# Patient Record
Sex: MALE | Race: WHITE | Employment: OTHER | URBAN - METROPOLITAN AREA
[De-identification: names, ages, dates, MRNs, and addresses within clinical notes are randomized per-mention and may not be internally consistent; named-entity substitution may affect disease eponyms.]

---

## 2017-04-20 PROBLEM — Z87.442 HISTORY OF NEPHROLITHIASIS: Status: ACTIVE | Noted: 2017-04-20

## 2017-04-20 PROBLEM — K57.30 DIVERTICULOSIS OF LARGE INTESTINE WITHOUT HEMORRHAGE: Status: ACTIVE | Noted: 2017-04-20

## 2017-05-17 ENCOUNTER — HOSPITAL ENCOUNTER (OUTPATIENT)
Dept: ULTRASOUND IMAGING | Age: 67
Discharge: HOME | End: 2017-05-17
Attending: INTERNAL MEDICINE
Payer: MEDICARE

## 2017-05-17 DIAGNOSIS — R31.9 HEMATURIA: ICD-10-CM

## 2017-05-17 DIAGNOSIS — R10.9 RIGHT FLANK PAIN: ICD-10-CM

## 2017-05-17 DIAGNOSIS — Z87.442 HISTORY OF NEPHROLITHIASIS: ICD-10-CM

## 2017-05-17 DIAGNOSIS — R80.9 PROTEINURIA, UNSPECIFIED TYPE: ICD-10-CM

## 2017-05-17 PROCEDURE — 76770 US EXAM ABDO BACK WALL COMP: CPT

## 2017-05-17 NOTE — PROGRESS NOTES
Ultrasound suggests kidney stone on right but not obstructing and no hydronephrosis  Next step would be Urology eval

## 2017-05-26 PROBLEM — R73.9 ELEVATED BLOOD SUGAR: Status: ACTIVE | Noted: 2017-05-26

## 2017-05-26 PROBLEM — N40.1 BENIGN PROSTATIC HYPERPLASIA WITH LOWER URINARY TRACT SYMPTOMS: Status: ACTIVE | Noted: 2017-05-26

## 2017-05-31 PROBLEM — I25.83 CORONARY ARTERY DISEASE DUE TO LIPID RICH PLAQUE: Status: ACTIVE | Noted: 2017-05-31

## 2017-05-31 PROBLEM — I25.10 CORONARY ARTERY DISEASE DUE TO LIPID RICH PLAQUE: Status: ACTIVE | Noted: 2017-05-31

## 2019-07-18 PROBLEM — D53.9 MACROCYTIC ANEMIA: Status: ACTIVE | Noted: 2019-07-18

## 2019-07-18 PROBLEM — F10.10 ALCOHOL ABUSE: Status: ACTIVE | Noted: 2019-07-18

## 2019-07-18 PROBLEM — R97.20 ELEVATED PSA: Status: ACTIVE | Noted: 2019-07-18

## 2019-07-18 PROBLEM — R74.8 ELEVATED LIVER ENZYMES: Status: ACTIVE | Noted: 2019-07-18

## 2019-08-16 PROBLEM — E66.01 SEVERE OBESITY (HCC): Status: ACTIVE | Noted: 2019-08-16

## 2019-08-27 NOTE — H&P (VIEW-ONLY)
Yasmin Mosquera MD, [facility], [address] [place] [address]  Juan [facility]  Phone [phone]   Fax [phone]      Date of visit: 8/27/2019     Primary/Requesting provider: Maneul Downs MD    Chief Complaint   Patient presents with   [address] [place] [address] New Patient     skin cancer lesion-mid chest          HISTORY OF PRESENT ILLNESS  Marianela Concepcion is a 71 y.o. male. New Patient       Marianela Concepcion is an 71 y.o. male who presents for evaluation of a  skin cancer, suspicoius/worrisome lesion located on the central anterior chest.   Onset of symptoms was 4 months ago, with rapidly worsening course since that time. Symptoms include erythema and drainage. Patient denies  fever and chills. There is not a history of trauma to the area, although patient has tried to manually express the lesion believing it was a cyst or skin infection with enlargement each time. Treatment to date has included none     Medications:   Current Outpatient Medications   Medication Sig    levothyroxine (SYNTHROID) 200 mcg tablet Take 1 Tab by mouth Daily (before breakfast).  omeprazole (PRILOSEC) 20 mg capsule Take 1 Cap by mouth daily.  losartan-hydroCHLOROthiazide (HYZAAR) 100-25 mg per tablet Take 1 Tab by mouth daily.  spironolactone (ALDACTONE) 50 mg tablet Take 1 Tab by mouth daily.  tamsulosin (FLOMAX) 0.4 mg capsule Take 1 Cap by mouth daily. Indications: enlarged prostate with urination problem, stones in the urinary tract    traMADol (ULTRAM) 50 mg tablet Take 1 Tab by mouth every eight (8) hours as needed for Pain for up to 30 days. Max Daily Amount: 150 mg.    tadalafil (CIALIS) 20 mg tablet Take 1 Tab by mouth as needed.  clotrimazole-betamethasone (LOTRISONE) topical cream Apply  to affected area two (2) times a day. (Patient taking differently: Apply  to affected area two (2) times daily as needed.)     No current facility-administered medications for this visit.          Allergies: No Known Allergies     Past History:  Past Medical History:   Diagnosis Date    Aortic heart murmur 2019    Per pt  told by Dr. Graig Mohs ASCVD (arteriosclerotic cardiovascular disease)     calcification on CT scan    BCC (basal cell carcinoma of skin)     Diverticulosis 04/17/2017    CT scan- fatty liver, diverticulosis, umbilical hernia, prostate enlargement, normal appendix    Elevated liver enzymes     history of hepatic steatosis    Elevated PSA, less than 10 ng/ml 2019    GERD (gastroesophageal reflux disease) 06/26/2014    Managed with meds     Hypertension 06/26/2014    Managed with meds     Impotence of organic origin     Liver disease     ascites, per PCP office note dated 7/18/19- Fluid retention is impressive.  Macrocytic anemia     Mixed hyperlipidemia 6/26/2014    Nephrolithiasis     Obesity 5/8/2015    Osteoarthrosis 6/27/2014    Unspecified hypothyroidism 6/26/2014      History reviewed. No pertinent surgical history. Family and Social History:  Family History   Problem Relation Age of Onset    Cancer Father         pancreatic     Social History     Socioeconomic History    Marital status:      Spouse name: Not on file    Number of children: Not on file    Years of education: Not on file    Highest education level: Not on file   Occupational History    Not on file   Social Needs    Financial resource strain: Not on file    Food insecurity:     Worry: Not on file     Inability: Not on file    Transportation needs:     Medical: Not on file     Non-medical: Not on file   Tobacco Use    Smoking status: Never Smoker    Smokeless tobacco: Never Used   Substance and Sexual Activity    Alcohol use:  Yes     Alcohol/week: 14.0 standard drinks     Types: 14 Glasses of wine per week    Drug use: No    Sexual activity: Not on file   Lifestyle    Physical activity:     Days per week: Not on file     Minutes per session: Not on file    Stress: Not on file   Relationships  Social connections:     Talks on phone: Not on file     Gets together: Not on file     Attends Rastafarian service: Not on file     Active member of club or organization: Not on file     Attends meetings of clubs or organizations: Not on file     Relationship status: Not on file    Intimate partner violence:     Fear of current or ex partner: Not on file     Emotionally abused: Not on file     Physically abused: Not on file     Forced sexual activity: Not on file   Other Topics Concern    Not on file   Social History Narrative    Not on file         Review of Systems   Constitutional: Negative. HENT: Negative. Eyes: Negative. Respiratory: Negative. Cardiovascular: Negative. Gastrointestinal:        Ascites,   Genitourinary: Negative. Musculoskeletal: Negative. Skin:        Skin lesion on chest present x 4 months   Neurological: Negative. Endo/Heme/Allergies: Negative. Psychiatric/Behavioral: Negative. Physical Exam   Constitutional: He is oriented to person, place, and time. He appears well-developed. Eyes: No scleral icterus. Cardiovascular: Normal rate. Murmur heard. Pulmonary/Chest: Effort normal. He has no wheezes. Neurological: He is alert and oriented to person, place, and time. No cranial nerve deficit. Skin: Skin is warm and dry. approx 2cm malignancy of central sternum, with central ulcer and eschar and heaped, pearly peripheral margins. Surrounding 2cm zone of mild hyperemia without induration   Psychiatric: He has a normal mood and affect. His behavior is normal. Thought content normal.   Vitals reviewed. ASSESSMENT and PLAN  Encounter Diagnoses   Name Primary?  Basal cell carcinoma (BCC) of chest Yes     Will arrange excision of the lesion(s), in the OR due to probable need for significant skin mobilization to allow closure. Procedure reviewed.   Risks reviewed to include bleeding, infection, scarring, recurrence, and need for re-excision if procedure done for malignant process.

## 2019-08-28 ENCOUNTER — HOSPITAL ENCOUNTER (OUTPATIENT)
Dept: SURGERY | Age: 69
Discharge: HOME | End: 2019-08-28

## 2019-08-28 VITALS — HEIGHT: 71 IN | BODY MASS INDEX: 31.92 KG/M2 | WEIGHT: 228 LBS

## 2019-08-28 NOTE — PERIOP NOTES
Anesthesia () reviewed chart. Agreeable for pt to proceed to surgery if receiving monitored anesthesia w/local for procedure. Per , would like for pt to have an echo prior to general anesthesia due to extension medical history. Mikala Espana

## 2019-08-28 NOTE — PERIOP NOTES
Patient verified name and . Order for consent not found in EHR. Type 1B surgery, PAT phone assessment complete. Orders not received. Labs per surgeon: no orders received. Labs per anesthesia protocol: K+ and HGB DOS- pt states he will have lab work drawn on the [employer]. Pt informed if K+ or Hgb is abnormal that surgery could be cancelled. Aortic murmur documented in chart. Pt states he was told by Dr. Aida Garcia that he had a heart murmur. Pt denies ever having an echo. Per PCP office note dated 19- Cardiovascular: Normal rate and regular rhythm. Harsh holosystolic m. Will have anesthesia review PCP office note and chart. Patient answered medical/surgical history questions at their best of ability. All prior to admission medications documented in [facility]. Patient instructed to take the following medications the day of surgery according to anesthesia guidelines with a small sip of water: levothyroxine, prilosec and flomax. Hold all vitamins 7 days prior to surgery and NSAIDS 5 days prior to surgery. Patient instructed on the following:  Arrive at A Entrance, time of arrival to be called the day before by 1700  NPO after midnight including gum, mints, and ice chips  Responsible adult must drive patient to the hospital, stay during surgery, and patient will need supervision 24 hours after anesthesia  Use antibacterial soap in shower the night before surgery and on the morning of surgery  All piercings must be removed prior to arrival.    Leave all valuables (money and jewelry) at home but bring insurance card and ID on   DOS. Do not wear make-up, nail polish, lotions, cologne, perfumes, powders, or oil on skin. Patient teach back successful and patient demonstrates knowledge of instruction.

## 2019-09-03 ENCOUNTER — ANESTHESIA (OUTPATIENT)
Dept: SURGERY | Age: 69
End: 2019-09-03
Payer: MEDICARE

## 2019-09-03 ENCOUNTER — HOSPITAL ENCOUNTER (OUTPATIENT)
Age: 69
Setting detail: OUTPATIENT SURGERY
Discharge: HOME | End: 2019-09-03
Attending: SURGERY | Admitting: SURGERY
Payer: MEDICARE

## 2019-09-03 ENCOUNTER — ANESTHESIA EVENT (OUTPATIENT)
Dept: SURGERY | Age: 69
End: 2019-09-03
Payer: MEDICARE

## 2019-09-03 VITALS
OXYGEN SATURATION: 96 % | SYSTOLIC BLOOD PRESSURE: 102 MMHG | HEIGHT: 71 IN | WEIGHT: 226.6 LBS | HEART RATE: 95 BPM | RESPIRATION RATE: 18 BRPM | TEMPERATURE: 98.1 F | DIASTOLIC BLOOD PRESSURE: 61 MMHG | BODY MASS INDEX: 31.72 KG/M2

## 2019-09-03 DIAGNOSIS — C44.90 SKIN CANCER: Primary | ICD-10-CM

## 2019-09-03 LAB
HGB BLD-MCNC: 9.8 G/DL (ref 13.6–17.2)
POTASSIUM BLD-SCNC: 4.9 MMOL/L (ref 3.5–5.1)

## 2019-09-03 PROCEDURE — 77030031139 HC SUT VCRL2 J&J -A: Performed by: SURGERY

## 2019-09-03 PROCEDURE — 76060000032 HC ANESTHESIA 0.5 TO 1 HR: Performed by: SURGERY

## 2019-09-03 PROCEDURE — 84132 ASSAY OF SERUM POTASSIUM: CPT

## 2019-09-03 PROCEDURE — 76010000138 HC OR TIME 0.5 TO 1 HR: Performed by: SURGERY

## 2019-09-03 PROCEDURE — 88305 TISSUE EXAM BY PATHOLOGIST: CPT

## 2019-09-03 PROCEDURE — 76210000020 HC REC RM PH II FIRST 0.5 HR: Performed by: SURGERY

## 2019-09-03 PROCEDURE — 74011250636 HC RX REV CODE- 250/636

## 2019-09-03 PROCEDURE — 77030010509 HC AIRWY LMA MSK TELE -A: Performed by: ANESTHESIOLOGY

## 2019-09-03 PROCEDURE — 74011250636 HC RX REV CODE- 250/636: Performed by: ANESTHESIOLOGY

## 2019-09-03 PROCEDURE — 77030002916 HC SUT ETHLN J&J -A: Performed by: SURGERY

## 2019-09-03 PROCEDURE — 85018 HEMOGLOBIN: CPT

## 2019-09-03 PROCEDURE — 77030018836 HC SOL IRR NACL ICUM -A: Performed by: SURGERY

## 2019-09-03 PROCEDURE — 74011000250 HC RX REV CODE- 250: Performed by: SURGERY

## 2019-09-03 PROCEDURE — 76210000063 HC OR PH I REC FIRST 0.5 HR: Performed by: SURGERY

## 2019-09-03 PROCEDURE — 74011000250 HC RX REV CODE- 250

## 2019-09-03 RX ORDER — DEXAMETHASONE SODIUM PHOSPHATE 4 MG/ML
INJECTION, SOLUTION INTRA-ARTICULAR; INTRALESIONAL; INTRAMUSCULAR; INTRAVENOUS; SOFT TISSUE AS NEEDED
Status: DISCONTINUED | OUTPATIENT
Start: 2019-09-03 | End: 2019-09-03 | Stop reason: HOSPADM

## 2019-09-03 RX ORDER — MIDAZOLAM HYDROCHLORIDE 1 MG/ML
5 INJECTION, SOLUTION INTRAMUSCULAR; INTRAVENOUS ONCE
Status: DISCONTINUED | OUTPATIENT
Start: 2019-09-03 | End: 2019-09-03 | Stop reason: HOSPADM

## 2019-09-03 RX ORDER — SODIUM CHLORIDE, SODIUM LACTATE, POTASSIUM CHLORIDE, CALCIUM CHLORIDE 600; 310; 30; 20 MG/100ML; MG/100ML; MG/100ML; MG/100ML
75 INJECTION, SOLUTION INTRAVENOUS CONTINUOUS
Status: DISCONTINUED | OUTPATIENT
Start: 2019-09-03 | End: 2019-09-03 | Stop reason: HOSPADM

## 2019-09-03 RX ORDER — ONDANSETRON 2 MG/ML
INJECTION INTRAMUSCULAR; INTRAVENOUS AS NEEDED
Status: DISCONTINUED | OUTPATIENT
Start: 2019-09-03 | End: 2019-09-03 | Stop reason: HOSPADM

## 2019-09-03 RX ORDER — PROPOFOL 10 MG/ML
INJECTION, EMULSION INTRAVENOUS AS NEEDED
Status: DISCONTINUED | OUTPATIENT
Start: 2019-09-03 | End: 2019-09-03 | Stop reason: HOSPADM

## 2019-09-03 RX ORDER — OXYCODONE HYDROCHLORIDE 5 MG/1
5 TABLET ORAL
Status: DISCONTINUED | OUTPATIENT
Start: 2019-09-03 | End: 2019-09-03 | Stop reason: HOSPADM

## 2019-09-03 RX ORDER — EPHEDRINE SULFATE 50 MG/ML
INJECTION, SOLUTION INTRAVENOUS AS NEEDED
Status: DISCONTINUED | OUTPATIENT
Start: 2019-09-03 | End: 2019-09-03 | Stop reason: HOSPADM

## 2019-09-03 RX ORDER — HYDROMORPHONE HYDROCHLORIDE 2 MG/ML
0.5 INJECTION, SOLUTION INTRAMUSCULAR; INTRAVENOUS; SUBCUTANEOUS
Status: DISCONTINUED | OUTPATIENT
Start: 2019-09-03 | End: 2019-09-03 | Stop reason: HOSPADM

## 2019-09-03 RX ORDER — FENTANYL CITRATE 50 UG/ML
INJECTION, SOLUTION INTRAMUSCULAR; INTRAVENOUS AS NEEDED
Status: DISCONTINUED | OUTPATIENT
Start: 2019-09-03 | End: 2019-09-03 | Stop reason: HOSPADM

## 2019-09-03 RX ORDER — FENTANYL CITRATE 50 UG/ML
100 INJECTION, SOLUTION INTRAMUSCULAR; INTRAVENOUS ONCE
Status: DISCONTINUED | OUTPATIENT
Start: 2019-09-03 | End: 2019-09-03 | Stop reason: HOSPADM

## 2019-09-03 RX ORDER — LIDOCAINE HYDROCHLORIDE 10 MG/ML
0.1 INJECTION INFILTRATION; PERINEURAL AS NEEDED
Status: DISCONTINUED | OUTPATIENT
Start: 2019-09-03 | End: 2019-09-03 | Stop reason: HOSPADM

## 2019-09-03 RX ORDER — HYDROCODONE BITARTRATE AND ACETAMINOPHEN 5; 325 MG/1; MG/1
2 TABLET ORAL AS NEEDED
Status: DISCONTINUED | OUTPATIENT
Start: 2019-09-03 | End: 2019-09-03 | Stop reason: HOSPADM

## 2019-09-03 RX ORDER — LIDOCAINE HYDROCHLORIDE 20 MG/ML
INJECTION, SOLUTION EPIDURAL; INFILTRATION; INTRACAUDAL; PERINEURAL AS NEEDED
Status: DISCONTINUED | OUTPATIENT
Start: 2019-09-03 | End: 2019-09-03 | Stop reason: HOSPADM

## 2019-09-03 RX ORDER — MIDAZOLAM HYDROCHLORIDE 1 MG/ML
2 INJECTION, SOLUTION INTRAMUSCULAR; INTRAVENOUS
Status: COMPLETED | OUTPATIENT
Start: 2019-09-03 | End: 2019-09-03

## 2019-09-03 RX ORDER — HYDROCODONE BITARTRATE AND ACETAMINOPHEN 5; 325 MG/1; MG/1
TABLET ORAL
Qty: 20 TAB | Refills: 0 | Status: SHIPPED | OUTPATIENT
Start: 2019-09-03 | End: 2019-09-10 | Stop reason: ALTCHOICE

## 2019-09-03 RX ORDER — BUPIVACAINE HYDROCHLORIDE AND EPINEPHRINE 2.5; 5 MG/ML; UG/ML
INJECTION, SOLUTION EPIDURAL; INFILTRATION; INTRACAUDAL; PERINEURAL AS NEEDED
Status: DISCONTINUED | OUTPATIENT
Start: 2019-09-03 | End: 2019-09-03 | Stop reason: HOSPADM

## 2019-09-03 RX ADMIN — DEXAMETHASONE SODIUM PHOSPHATE 10 MG: 4 INJECTION, SOLUTION INTRA-ARTICULAR; INTRALESIONAL; INTRAMUSCULAR; INTRAVENOUS; SOFT TISSUE at 14:02

## 2019-09-03 RX ADMIN — ONDANSETRON 4 MG: 2 INJECTION INTRAMUSCULAR; INTRAVENOUS at 14:02

## 2019-09-03 RX ADMIN — PROPOFOL 200 MG: 10 INJECTION, EMULSION INTRAVENOUS at 13:53

## 2019-09-03 RX ADMIN — EPHEDRINE SULFATE 15 MG: 50 INJECTION, SOLUTION INTRAVENOUS at 14:09

## 2019-09-03 RX ADMIN — FENTANYL CITRATE 25 MCG: 50 INJECTION, SOLUTION INTRAMUSCULAR; INTRAVENOUS at 14:12

## 2019-09-03 RX ADMIN — FENTANYL CITRATE 25 MCG: 50 INJECTION, SOLUTION INTRAMUSCULAR; INTRAVENOUS at 14:04

## 2019-09-03 RX ADMIN — SODIUM CHLORIDE, SODIUM LACTATE, POTASSIUM CHLORIDE, AND CALCIUM CHLORIDE: 600; 310; 30; 20 INJECTION, SOLUTION INTRAVENOUS at 14:21

## 2019-09-03 RX ADMIN — HYDROMORPHONE HYDROCHLORIDE 0.5 MG: 2 INJECTION INTRAMUSCULAR; INTRAVENOUS; SUBCUTANEOUS at 14:58

## 2019-09-03 RX ADMIN — EPHEDRINE SULFATE 15 MG: 50 INJECTION, SOLUTION INTRAVENOUS at 14:15

## 2019-09-03 RX ADMIN — MIDAZOLAM 2 MG: 1 INJECTION INTRAMUSCULAR; INTRAVENOUS at 13:43

## 2019-09-03 RX ADMIN — LIDOCAINE HYDROCHLORIDE 100 MG: 20 INJECTION, SOLUTION EPIDURAL; INFILTRATION; INTRACAUDAL; PERINEURAL at 13:53

## 2019-09-03 RX ADMIN — FENTANYL CITRATE 50 MCG: 50 INJECTION, SOLUTION INTRAMUSCULAR; INTRAVENOUS at 13:53

## 2019-09-03 RX ADMIN — EPHEDRINE SULFATE 15 MG: 50 INJECTION, SOLUTION INTRAVENOUS at 14:00

## 2019-09-03 RX ADMIN — HYDROMORPHONE HYDROCHLORIDE 0.5 MG: 2 INJECTION INTRAMUSCULAR; INTRAVENOUS; SUBCUTANEOUS at 14:52

## 2019-09-03 RX ADMIN — SODIUM CHLORIDE, SODIUM LACTATE, POTASSIUM CHLORIDE, AND CALCIUM CHLORIDE 75 ML/HR: 600; 310; 30; 20 INJECTION, SOLUTION INTRAVENOUS at 12:43

## 2019-09-03 NOTE — ANESTHESIA PREPROCEDURE EVALUATION
Relevant Problems   No relevant active problems       Anesthetic History   No history of anesthetic complications            Review of Systems / Medical History  Patient summary reviewed and pertinent labs reviewed    Pulmonary  Within defined limits                 Neuro/Psych   Within defined limits           Cardiovascular    Hypertension: well controlled              Exercise tolerance: >4 METS     GI/Hepatic/Renal           Liver disease (Elevated LFTs)    Comments: Alcohol abuse Endo/Other      Hypothyroidism: well controlled  Anemia     Other Findings            Physical Exam    Airway  Mallampati: II  TM Distance: 4 - 6 cm  Neck ROM: normal range of motion   Mouth opening: Normal     Cardiovascular  Regular rate and rhythm,  S1 and S2 normal,  no murmur, click, rub, or gallop             Dental         Pulmonary  Breath sounds clear to auscultation               Abdominal         Other Findings            Anesthetic Plan    ASA: 3  Anesthesia type: general          Induction: Intravenous  Anesthetic plan and risks discussed with: Patient and Spouse

## 2019-09-03 NOTE — DISCHARGE INSTRUCTIONS
Patient Education        Skin Lesion Removal: What to Expect at Home  Your Recovery  After your procedure, you should not have much pain. But some soreness, swelling, or bruising is normal. Your doctor may recommend over-the-counter medicines to help with any discomfort. Most people can return to their normal routine the same day of their procedure. How quickly your wound heals depends on the size of your wound and the type of procedure you had. Most wounds take 1 to 3 weeks to heal. If you had laser surgery, your skin may change color and then slowly return to its normal color. You may need only a bandage, or you may need stitches. If you had stitches, your doctor will probably remove them 5 to 14 days later. If you have the type of stitches that dissolve, they do not have to be removed. They will disappear on their own. This care sheet gives you a general idea about how long it will take for you to recover. But each person recovers at a different pace. Follow the steps below to get better as quickly as possible. How can you care for yourself at home? Activity    · For the first few days, try not to bump or knock your wound.     · Depending on where your wound is, you may need to avoid strenuous exercise for 2 weeks after the procedure or until your doctor says it is okay.     · If you have had a lesion removed from your face, do not use makeup near your wound until you have your stitches taken out.     · Ask your doctor when it is okay to shower, bathe, or swim. Medicines    · Your doctor will tell you if and when you can restart your medicines. He or she will also give you instructions about taking any new medicines.     · If you take blood thinners, such as warfarin (Coumadin), clopidogrel (Plavix), or aspirin, be sure to talk to your doctor. He or she will tell you if and when to start taking those medicines again.  Make sure that you understand exactly what your doctor wants you to do.     · Be safe with medicines. Take pain medicines exactly as directed. ? If the doctor gave you a prescription medicine for pain, take it as prescribed. ? If you are not taking a prescription pain medicine, ask your doctor if you can take an over-the-counter medicine.    Wound care    · If your doctor told you how to care for your incision, follow your doctor's instructions. If you did not get instructions, follow this general advice:  ? Keep the wound bandaged and dry for the first day. ? After the first 24 to 48 hours, wash around the wound with clean water 2 times a day. Don't use hydrogen peroxide or alcohol, which can slow healing. ? You may cover the wound with a thin layer of petroleum jelly, such as Vaseline, and a nonstick bandage. ? Apply more petroleum jelly and replace the bandage as needed.     · If you have stitches, you may get other instructions.     · If a scab forms, do not pull it off. Let it fall off on its own. Wounds heal faster if no scab forms. Washing the area every day and using petroleum jelly will help prevent a scab from forming.     · If the wound bleeds, put direct pressure on it with a clean cloth until the bleeding stops.     · If you had a growth \"frozen\" off, you may get a blister. Do not break it. Let it dry up on its own. It is common for the blister to fill with blood. You do not need to do anything about this, but if it becomes too painful, call your doctor.     · Avoid the sun until your stitches are removed. Follow-up care is a key part of your treatment and safety. Be sure to make and go to all appointments, and call your doctor if you are having problems. It's also a good idea to know your test results and keep a list of the medicines you take. When should you call for help? Call 911 anytime you think you may need emergency care.  For example, call if:    · You passed out (lost consciousness).     · You have severe trouble breathing.     · You have sudden chest pain and shortness of breath, or you cough up blood.    Call your doctor now or seek immediate medical care if:    · You have symptoms of a blood clot in your leg (called a deep vein thrombosis), such as:  ? Pain in the calf, back of the knee, thigh, or groin. ? Redness and swelling in your leg or groin.     · You have signs of infection, such as:  ? Increased pain, swelling, warmth, or redness. ? Red streaks leading from the wound. ? Pus draining from the wound. ? A fever.     · You have pain that does not get better after you take pain medicine.     · You have loose stitches.    Watch closely for changes in your health, and be sure to contact your doctor if you have any problems. Where can you learn more? Go to http://katheryn-jose antonio.info/. Enter Q228 in the search box to learn more about \"Skin Lesion Removal: What to Expect at Home. \"  Current as of: April 1, 2019  Content Version: 12.1  © 8326-1725 [employer]. Care instructions adapted under license by [employer] (which disclaims liability or warranty for this information). If you have questions about a medical condition or this instruction, always ask your healthcare professional. [place] [address] any warranty or liability for your use of this information.

## 2019-09-03 NOTE — INTERVAL H&P NOTE
H&P Update:  Heriberto Reynolds. Ressie Dancer was seen and examined. History and physical has been reviewed. The patient has been examined.  There have been no significant clinical changes since the completion of the originally dated History and Physical.

## 2019-09-03 NOTE — ANESTHESIA POSTPROCEDURE EVALUATION
Procedure(s):  BASAL CELL MASS EXCISION OF CHEST.    general    Anesthesia Post Evaluation      Multimodal analgesia: multimodal analgesia used between 6 hours prior to anesthesia start to PACU discharge  Patient location during evaluation: bedside  Patient participation: complete - patient participated  Level of consciousness: awake and alert  Pain score: 3  Pain management: adequate  Airway patency: patent  Anesthetic complications: no  Cardiovascular status: acceptable and hemodynamically stable  Respiratory status: acceptable  Hydration status: acceptable  Post anesthesia nausea and vomiting:  none      Vitals Value Taken Time   /61 9/3/2019  2:28 PM   Temp 36.7 °C (98.1 °F) 9/3/2019  2:28 PM   Pulse 96 9/3/2019  2:28 PM   Resp 12 9/3/2019  2:28 PM   SpO2 93 % 9/3/2019  2:28 PM

## 2019-09-03 NOTE — OP NOTES
Excision of Mass Operative Report      Date of Procedure: 9/3/2019    Preoperative Diagnosis: Basal cell carcinoma (BCC), unspecified site [C44.91]    Postoperative Diagnosis:  Basal cell carcinoma (BCC), unspecified site [C44.91]    Surgeon(s) and Role:     * Jeanie Villalpando MD - Primary      Anesthesia:  MAC    Procedure: Procedure(s):  BASAL CELL MASS EXCISION OF CHEST   INTERMEDIATE CLOSURE OF SKIN WOUND OF CHEST, 6CM LENGTH    Procedure in Detail:    Informed consent was obtained and the patient was brought to the operating room and placed in a prone position. After the patient was anesthetized, the central chest/sternal area was prepped and draped in a sterile fashion. An elliptical incision was made encompassing the lesion with a minimum 2mm rim of visibly normal skin. This was then dissected from the surrounding tissue using sharp dissection and cautery. Cautery was used for hemostasis, and the area was infiltrated with local.  The specimen measured 6 x 4.5cm. The wound was then prepared for closure by sharply elevating the skin and superficial subcutaneous tissue from the deep subcutaneous tissue around the margins of the wound for up to 4cm. It was then closed with layered running 3-0 and subcuticular 4-0 Vicryl. Steristrips and gauze were applied. The patient tolerated the procedure well, and was taken to the recovery room in satisfactory condition.       Specimens:   ID Type Source Tests Collected by Time Destination   1 : Skin lesion of chest Preservative   Jeanie Villalpando MD 9/3/2019 1407 Pathology             Signed By: Faith Nolan MD     September 3, 2019

## 2019-09-18 PROBLEM — C44.529 SQUAMOUS CELL CARCINOMA OF SKIN OF CHEST: Status: ACTIVE | Noted: 2019-09-18

## 2019-10-08 PROBLEM — K70.31 ALCOHOLIC CIRRHOSIS OF LIVER WITH ASCITES (HCC): Status: ACTIVE | Noted: 2019-10-08

## 2019-10-08 PROBLEM — F32.9 REACTIVE DEPRESSION: Status: ACTIVE | Noted: 2019-10-08

## 2019-10-08 PROBLEM — I35.0 NONRHEUMATIC AORTIC VALVE STENOSIS: Status: ACTIVE | Noted: 2019-10-08

## 2019-11-19 PROBLEM — N18.30 ACUTE RENAL FAILURE SUPERIMPOSED ON STAGE 3 CHRONIC KIDNEY DISEASE (HCC): Status: ACTIVE | Noted: 2019-11-19

## 2019-11-19 PROBLEM — R60.1 ANASARCA: Status: ACTIVE | Noted: 2019-11-19

## 2019-11-19 PROBLEM — N17.9 ACUTE RENAL FAILURE SUPERIMPOSED ON STAGE 3 CHRONIC KIDNEY DISEASE (HCC): Status: ACTIVE | Noted: 2019-11-19

## 2019-11-19 PROBLEM — K72.10 CHRONIC LIVER FAILURE WITHOUT HEPATIC COMA (HCC): Status: ACTIVE | Noted: 2019-11-19

## 2019-12-02 ENCOUNTER — HOSPITAL ENCOUNTER (OUTPATIENT)
Dept: INTERVENTIONAL RADIOLOGY/VASCULAR | Age: 69
Discharge: HOME | End: 2019-12-02
Attending: INTERNAL MEDICINE
Payer: MEDICARE

## 2019-12-02 VITALS
TEMPERATURE: 97.9 F | OXYGEN SATURATION: 96 % | DIASTOLIC BLOOD PRESSURE: 78 MMHG | HEART RATE: 84 BPM | RESPIRATION RATE: 15 BRPM | SYSTOLIC BLOOD PRESSURE: 120 MMHG

## 2019-12-02 DIAGNOSIS — E78.2 MIXED HYPERLIPIDEMIA: ICD-10-CM

## 2019-12-02 PROCEDURE — 74011250636 HC RX REV CODE- 250/636: Performed by: PHYSICIAN ASSISTANT

## 2019-12-02 PROCEDURE — 77030037400 HC ADH TISS HI VISC EXOFIN CHMP -B

## 2019-12-02 PROCEDURE — P9047 ALBUMIN (HUMAN), 25%, 50ML: HCPCS | Performed by: PHYSICIAN ASSISTANT

## 2019-12-02 PROCEDURE — 77030014146 HC TY THORCENT/PARACENT BD -B

## 2019-12-02 PROCEDURE — 49083 ABD PARACENTESIS W/IMAGING: CPT

## 2019-12-02 RX ORDER — ALBUMIN HUMAN 250 G/1000ML
12.5 SOLUTION INTRAVENOUS ONCE
Status: COMPLETED | OUTPATIENT
Start: 2019-12-02 | End: 2019-12-02

## 2019-12-02 RX ORDER — ALBUMIN HUMAN 250 G/1000ML
25 SOLUTION INTRAVENOUS ONCE
Status: COMPLETED | OUTPATIENT
Start: 2019-12-02 | End: 2019-12-02

## 2019-12-02 RX ADMIN — ALBUMIN (HUMAN) 25 G: 0.25 INJECTION, SOLUTION INTRAVENOUS at 13:14

## 2019-12-02 RX ADMIN — ALBUMIN (HUMAN) 12.5 G: 0.25 INJECTION, SOLUTION INTRAVENOUS at 14:32

## 2019-12-02 NOTE — PROGRESS NOTES
Procedure complete. 15624 ml clear,yellow ascitic fluid drained during paracentesis. Patient tolerated well. I have reviewed discharge instructions with the patient and spouse. The patient verbalized understanding. Patient ambulatory to waiting room. NAD upon discharge.

## 2019-12-02 NOTE — PROGRESS NOTES
Patient to IR room 7 for procedure. Patient awake and alert, verbalized name, , and procedure to be performed. Patient remains on stretcher for procedure.

## 2019-12-02 NOTE — DISCHARGE INSTRUCTIONS
[address]  Department of Interventional Radiology  [facility]  [phone] Office  [phone] Fax    PARACENTESIS DISCHARGE INSTRUCTIONS    General Information:  During this procedure, the doctor will insert a needle into the abdomen to drain fluid. After the procedure, you will be able to take a deep breath much easier. The site of the puncture may ooze the first day. This will decrease and eventually stop. Paracentesis (draining fluid from the abdomen) sometimes makes patients hypotensive (low blood pressure). Your doctor may order for you to receive fluids or albumin (a volume booster) during the procedure through an IV site. Home Care Instructions:  Keep the puncture site clean and dry. No tub baths or swimming until puncture site heals. Showering is acceptable. Resume your normal diet, and resume your normal activity slowly and as you tolerate. If you are short of breath, rest. If shortness of breath does not ease, please call your ordering doctor. Fluid can re-accumulate in the chest and/or in the abdomen. If this should occur, your doctor needs to know as you may need to have the procedure done again. Call If:     You should call your Physician and/or the Radiology Nurse if you notice any signs of infection, like pus draining, or if it is swollen or reddened. Also call if you have a fever, or if you are bleeding from the puncture site more than a small amount on the dressing. Call if the puncture site keeps draining fluid. Some oozing is to be expected, but should slow and then stop. Call if you feel like you have pressure in your abdomen. SEEK IMMEDIATE CARE OR CALL 911 IF YOU SUDDENLY HAVE TROUBLE BREATHING, OR IF YOUR LIPS TURN BLUE, OR IF YOU NOTICE BLOOD IN YOUR SPUTUM. Follow-Up Instructions: Please see your ordering doctor as he/she has requested. To Reach Us:   If you have any questions about your procedure, please call the Interventional Radiology department at [phone]. After business hours (5pm) and weekends, call the answering service at [phone] and ask for the Radiologist on call to be paged. Interventional Radiology General Nurse Discharge    After general anesthesia or intravenous sedation, for 24 hours or while taking prescription Narcotics:  · Limit your activities  · Do not drive and operate hazardous machinery  · Do not make important personal or business decisions  · Do  not drink alcoholic beverages  · If you have not urinated within 8 hours after discharge, please contact your surgeon on call. * Please give a list of your current medications to your Primary Care Provider. * Please update this list whenever your medications are discontinued, doses are     changed, or new medications (including over-the-counter products) are added. * Please carry medication information at all times in case of emergency situations. These are general instructions for a healthy lifestyle:    No smoking/ No tobacco products/ Avoid exposure to second hand smoke  Surgeon General's Warning:  Quitting smoking now greatly reduces serious risk to your health. Obesity, smoking, and sedentary lifestyle greatly increases your risk for illness  A healthy diet, regular physical exercise & weight monitoring are important for maintaining a healthy lifestyle    You may be retaining fluid if you have a history of heart failure or if you experience any of the following symptoms:  Weight gain of 3 pounds or more overnight or 5 pounds in a week, increased swelling in our hands or feet or shortness of breath while lying flat in bed. Please call your doctor as soon as you notice any of these symptoms; do not wait until your next office visit.     Recognize signs and symptoms of STROKE:  F-face looks uneven    A-arms unable to move or move unevenly    S-speech slurred or non-existent    T-time-call 911 as soon as signs and symptoms begin-DO NOT go       Back to bed or wait to see if you get better-TIME IS BRAIN.              Date: 12/2/2019  Discharging Nurse: Brinda Valadez RN

## 2019-12-10 ENCOUNTER — ANESTHESIA EVENT (OUTPATIENT)
Dept: ENDOSCOPY | Age: 69
End: 2019-12-10
Payer: MEDICARE

## 2019-12-10 ENCOUNTER — ANESTHESIA (OUTPATIENT)
Dept: ENDOSCOPY | Age: 69
End: 2019-12-10
Payer: MEDICARE

## 2019-12-10 ENCOUNTER — HOSPITAL ENCOUNTER (OUTPATIENT)
Age: 69
Setting detail: OUTPATIENT SURGERY
Discharge: HOME | End: 2019-12-10
Attending: INTERNAL MEDICINE | Admitting: INTERNAL MEDICINE
Payer: MEDICARE

## 2019-12-10 VITALS
WEIGHT: 233 LBS | BODY MASS INDEX: 32.62 KG/M2 | RESPIRATION RATE: 16 BRPM | DIASTOLIC BLOOD PRESSURE: 64 MMHG | TEMPERATURE: 98.6 F | SYSTOLIC BLOOD PRESSURE: 106 MMHG | HEART RATE: 76 BPM | HEIGHT: 71 IN | OXYGEN SATURATION: 98 %

## 2019-12-10 LAB
ANION GAP SERPL CALC-SCNC: 8 MMOL/L (ref 7–16)
BUN SERPL-MCNC: 29 MG/DL (ref 8–23)
CALCIUM SERPL-MCNC: 9 MG/DL (ref 8.3–10.4)
CHLORIDE SERPL-SCNC: 114 MMOL/L (ref 98–107)
CO2 SERPL-SCNC: 21 MMOL/L (ref 21–32)
CREAT SERPL-MCNC: 1.43 MG/DL (ref 0.8–1.5)
GLUCOSE SERPL-MCNC: 82 MG/DL (ref 65–100)
POTASSIUM SERPL-SCNC: 4.7 MMOL/L (ref 3.5–5.1)
SODIUM SERPL-SCNC: 143 MMOL/L (ref 136–145)

## 2019-12-10 PROCEDURE — 76040000025: Performed by: INTERNAL MEDICINE

## 2019-12-10 PROCEDURE — 74011000250 HC RX REV CODE- 250: Performed by: NURSE ANESTHETIST, CERTIFIED REGISTERED

## 2019-12-10 PROCEDURE — 74011250636 HC RX REV CODE- 250/636: Performed by: NURSE ANESTHETIST, CERTIFIED REGISTERED

## 2019-12-10 PROCEDURE — 76060000031 HC ANESTHESIA FIRST 0.5 HR: Performed by: INTERNAL MEDICINE

## 2019-12-10 PROCEDURE — 74011250636 HC RX REV CODE- 250/636: Performed by: INTERNAL MEDICINE

## 2019-12-10 PROCEDURE — 80048 BASIC METABOLIC PNL TOTAL CA: CPT

## 2019-12-10 PROCEDURE — 36415 COLL VENOUS BLD VENIPUNCTURE: CPT

## 2019-12-10 RX ORDER — PROPOFOL 10 MG/ML
INJECTION, EMULSION INTRAVENOUS AS NEEDED
Status: DISCONTINUED | OUTPATIENT
Start: 2019-12-10 | End: 2019-12-10 | Stop reason: HOSPADM

## 2019-12-10 RX ORDER — PROPOFOL 10 MG/ML
INJECTION, EMULSION INTRAVENOUS
Status: DISCONTINUED | OUTPATIENT
Start: 2019-12-10 | End: 2019-12-10 | Stop reason: HOSPADM

## 2019-12-10 RX ORDER — SODIUM CHLORIDE, SODIUM LACTATE, POTASSIUM CHLORIDE, CALCIUM CHLORIDE 600; 310; 30; 20 MG/100ML; MG/100ML; MG/100ML; MG/100ML
1000 INJECTION, SOLUTION INTRAVENOUS CONTINUOUS
Status: DISCONTINUED | OUTPATIENT
Start: 2019-12-10 | End: 2019-12-10 | Stop reason: HOSPADM

## 2019-12-10 RX ORDER — LIDOCAINE HYDROCHLORIDE 20 MG/ML
INJECTION, SOLUTION EPIDURAL; INFILTRATION; INTRACAUDAL; PERINEURAL AS NEEDED
Status: DISCONTINUED | OUTPATIENT
Start: 2019-12-10 | End: 2019-12-10 | Stop reason: HOSPADM

## 2019-12-10 RX ADMIN — PHENYLEPHRINE HYDROCHLORIDE 200 MCG: 10 INJECTION INTRAVENOUS at 10:47

## 2019-12-10 RX ADMIN — PHENYLEPHRINE HYDROCHLORIDE 200 MCG: 10 INJECTION INTRAVENOUS at 10:35

## 2019-12-10 RX ADMIN — PHENYLEPHRINE HYDROCHLORIDE 200 MCG: 10 INJECTION INTRAVENOUS at 10:51

## 2019-12-10 RX ADMIN — LIDOCAINE HYDROCHLORIDE 40 MG: 20 INJECTION, SOLUTION EPIDURAL; INFILTRATION; INTRACAUDAL; PERINEURAL at 10:36

## 2019-12-10 RX ADMIN — PROPOFOL 30 MG: 10 INJECTION, EMULSION INTRAVENOUS at 10:36

## 2019-12-10 RX ADMIN — PROPOFOL 140 MCG/KG/MIN: 10 INJECTION, EMULSION INTRAVENOUS at 10:36

## 2019-12-10 RX ADMIN — SODIUM CHLORIDE, SODIUM LACTATE, POTASSIUM CHLORIDE, AND CALCIUM CHLORIDE 1000 ML: 600; 310; 30; 20 INJECTION, SOLUTION INTRAVENOUS at 09:54

## 2019-12-10 RX ADMIN — PHENYLEPHRINE HYDROCHLORIDE 200 MCG: 10 INJECTION INTRAVENOUS at 10:39

## 2019-12-10 NOTE — PROCEDURES
Esophagogastroduodenoscopy DATE of PROCEDURE: 12/10/2019 MEDICATIONS ADMINISTERED: MAC INSTRUMENT: GIF 
 
PROCEDURE:  After obtaining informed consent, the patient was placed in the left lateral position and sedated. The endoscope was advanced under direct vision without difficulty. The esophagus, stomach (including retroflexed views) and duodenum were evaluated. The patient was taken to the recovery area in stable condition. FINDINGS: 
 
OROPHARYNX: Cords and pyriform recesses normal. 
ESOPHAGUS: The esophagus is normal. The proximal, mid and distal portions are normal. The Z-Line is unremarkable. No varices noted. STOMACH: The fundus on antegrade and retroflex views is normal. The body, antrum and pylorus are normal.   
DUODENUM: The bulb and second portions are normal. 
 
Estimated blood loss: 0-minimal  
 
PLAN: 
1. Repeat scope in 3 years or sooner if decompensates 2. Check BMP 3. Outpatient paracentesis.  
 
Nelly Groves MD 
[facility], [place]

## 2019-12-10 NOTE — DISCHARGE INSTRUCTIONS
Gastrointestinal Esophagogastroduodenoscopy (EGD)- Upper Exam Discharge Instructions    1. Call Dr. Claudene Signs  for any problems or questions. 2. Contact the doctor's office for follow up appointment as directed. 3. Medication may cause drowsiness for several hours, therefore, do not drive or operate machinery for remainder of the day. 4. No alcohol today. 5. Do not make any important decisions such as signing legal paperwork. 6. Ordinarily, you may resume regular diet and activity after exam unless otherwise specified by your physician. 7. For mild soreness in your throat you may use Cepacol throat lozenges or warm  salt-water gargles as needed. Any additional instructions:   1. Repeat scope in 3 years or sooner  2. Outpatient paracentesis.           Instructions given to Rudy Andrew and other family members.

## 2019-12-10 NOTE — ROUTINE PROCESS
VSS. Discharge instructions reviewed with patient and Lolly Sic, wife and copy of instructions sent home with patient. Dr. Abdoul Ospina spoke with patient and wife prior to discharge. Questions answered. Discharged via wheel chair, wheeled out by [employer], RT. IV discontinued prior to discharge. Personal items with patient at discharge: clothing

## 2019-12-10 NOTE — ANESTHESIA POSTPROCEDURE EVALUATION
Procedure(s): ESOPHAGOGASTRODUODENOSCOPY (EGD). total IV anesthesia Anesthesia Post Evaluation Patient location during evaluation: PACU Patient participation: complete - patient participated Level of consciousness: awake and alert Pain management: adequate Airway patency: patent Anesthetic complications: no 
Cardiovascular status: acceptable Respiratory status: acceptable Hydration status: acceptable Post anesthesia nausea and vomiting:  controlled Vitals Value Taken Time /64 12/10/2019 11:27 AM  
Temp 37 °C (98.6 °F) 12/10/2019 10:56 AM  
Pulse 74 12/10/2019 11:29 AM  
Resp 16 12/10/2019 11:27 AM  
SpO2 99 % 12/10/2019 11:29 AM  
Vitals shown include unvalidated device data.

## 2019-12-10 NOTE — H&P
History and Physical for Outpatient Procedure Date: 12/10/2019 History of Present Illness:  Patient with cirrhosis presents for EGD for evaluation of varices and possible variceal banding. Past Medical History:  
Diagnosis Date  Aortic heart murmur 2019 Per pt  told by Dr. Mary Moctezuma  ASCVD (arteriosclerotic cardiovascular disease)   
 calcification on CT scan  BCC (basal cell carcinoma of skin)  Diverticulosis 04/17/2017 CT scan- fatty liver, diverticulosis, umbilical hernia, prostate enlargement, normal appendix  Elevated liver enzymes   
 history of hepatic steatosis  Elevated PSA, less than 10 ng/ml 2019  GERD (gastroesophageal reflux disease) 06/26/2014 Managed with meds  Hypertension 06/26/2014 Managed with meds  Impotence of organic origin  Liver disease   
 ascites, per PCP office note dated 7/18/19- Fluid retention is impressive.  Macrocytic anemia  Mixed hyperlipidemia 6/26/2014  Nephrolithiasis  Obesity 5/8/2015  Osteoarthrosis 6/27/2014  Squamous cell carcinoma of skin of chest 2019  Unspecified hypothyroidism 6/26/2014 Past Surgical History:  
Procedure Laterality Date  IR PARACENTESIS ABD W IMAGE  12/2/2019 In and Family History Problem Relation Age of Onset  Cancer Father   
     pancreatic Social History Tobacco Use  Smoking status: Never Smoker  Smokeless tobacco: Never Used Substance Use Topics  Alcohol use: Yes Alcohol/week: 14.0 standard drinks Types: 14 Glasses of wine per week No Known Allergies Current Facility-Administered Medications Medication Dose Route Frequency  lactated Ringers infusion 1,000 mL  1,000 mL IntraVENous CONTINUOUS Review of Systems: A detailed 10 organ review of systems is obtained with pertinent positives as listed in the History of Present Illness. All others are negative. Objective:  
 
Physical Exam: Visit Vitals /44 Pulse 85 Temp 97.5 °F (36.4 °C) Resp 16 Ht 5' 11\" (1.803 m) Wt 105.7 kg (233 lb) SpO2 99% BMI 32.50 kg/m² General:  Alert and oriented. Heart: Regular rate and rhythm Lungs:  Clear to auscultation bilaterally Abdomen: Soft, nontender, nondistended Impression/Plan:  
 
Proceed with EGD with possible variceal banding. I have discussed with the patient the technique, benefits, alternatives, and risks of these procedures, including medication reaction, immediate or delayed bleeding, or perforation of the gastrointestinal tract. Signed By: Nelly Groves MD   
 December 10, 2019

## 2019-12-10 NOTE — ANESTHESIA PREPROCEDURE EVALUATION
Relevant Problems No relevant active problems Anesthetic History No history of anesthetic complications Review of Systems / Medical History Patient summary reviewed and pertinent labs reviewed Pulmonary Within defined limits Neuro/Psych Within defined limits Cardiovascular Hypertension: well controlled Valvular problems/murmurs: aortic stenosis Exercise tolerance[de-identified] He denied chest pain, syncope Comments: Echo 9/2019 - normal LV and RV function, mod-severe AS, mild to mod MR, mild to mod TR  
GI/Hepatic/Renal 
  
 
 
 
Liver disease (cirrhosis - underwent paracentesis last week) Endo/Other Hypothyroidism: well controlled Obesity, arthritis and anemia Other Findings Physical Exam 
 
Airway Mallampati: II 
TM Distance: 4 - 6 cm Neck ROM: normal range of motion Mouth opening: Normal 
 
 Cardiovascular Murmur, Aortic area Pertinent negatives: No JVD and peripheral edema Dental 
No notable dental hx Pulmonary Breath sounds clear to auscultation Abdominal 
 
Ascites Other Findings Anesthetic Plan ASA: 4 Anesthesia type: total IV anesthesia Induction: Intravenous Anesthetic plan and risks discussed with: Patient and Spouse

## 2019-12-13 ENCOUNTER — HOSPITAL ENCOUNTER (OUTPATIENT)
Dept: INTERVENTIONAL RADIOLOGY/VASCULAR | Age: 69
Discharge: HOME | End: 2019-12-13
Attending: INTERNAL MEDICINE
Payer: MEDICARE

## 2019-12-13 VITALS
DIASTOLIC BLOOD PRESSURE: 53 MMHG | RESPIRATION RATE: 18 BRPM | TEMPERATURE: 97.7 F | OXYGEN SATURATION: 99 % | SYSTOLIC BLOOD PRESSURE: 87 MMHG | HEART RATE: 90 BPM

## 2019-12-13 DIAGNOSIS — K76.6 HYPERTENSION, PORTAL (HCC): ICD-10-CM

## 2019-12-13 LAB
APPEARANCE FLD: CLEAR
COLOR FLD: YELLOW
NUC CELL # FLD: 18 /CU MM
RBC # FLD: <1000 /CU MM
SPECIMEN SOURCE FLD: NORMAL

## 2019-12-13 PROCEDURE — 77030037400 HC ADH TISS HI VISC EXOFIN CHMP -B

## 2019-12-13 PROCEDURE — 87205 SMEAR GRAM STAIN: CPT

## 2019-12-13 PROCEDURE — 49083 ABD PARACENTESIS W/IMAGING: CPT

## 2019-12-13 PROCEDURE — 74011250636 HC RX REV CODE- 250/636: Performed by: PHYSICIAN ASSISTANT

## 2019-12-13 PROCEDURE — P9047 ALBUMIN (HUMAN), 25%, 50ML: HCPCS | Performed by: PHYSICIAN ASSISTANT

## 2019-12-13 PROCEDURE — 84157 ASSAY OF PROTEIN OTHER: CPT

## 2019-12-13 PROCEDURE — 77030014146 HC TY THORCENT/PARACENT BD -B

## 2019-12-13 PROCEDURE — 82042 OTHER SOURCE ALBUMIN QUAN EA: CPT

## 2019-12-13 PROCEDURE — 74011250636 HC RX REV CODE- 250/636: Performed by: RADIOLOGY

## 2019-12-13 PROCEDURE — 89050 BODY FLUID CELL COUNT: CPT

## 2019-12-13 RX ORDER — SODIUM CHLORIDE 9 MG/ML
999 INJECTION, SOLUTION INTRAVENOUS ONCE
Status: COMPLETED | OUTPATIENT
Start: 2019-12-13 | End: 2019-12-13

## 2019-12-13 RX ORDER — ALBUMIN HUMAN 250 G/1000ML
25 SOLUTION INTRAVENOUS ONCE
Status: COMPLETED | OUTPATIENT
Start: 2019-12-13 | End: 2019-12-13

## 2019-12-13 RX ORDER — ALBUMIN HUMAN 250 G/1000ML
50 SOLUTION INTRAVENOUS ONCE
Status: COMPLETED | OUTPATIENT
Start: 2019-12-13 | End: 2019-12-13

## 2019-12-13 RX ADMIN — SODIUM CHLORIDE 999 ML/HR: 900 INJECTION, SOLUTION INTRAVENOUS at 17:36

## 2019-12-13 RX ADMIN — ALBUMIN (HUMAN) 50 G: 0.25 INJECTION, SOLUTION INTRAVENOUS at 15:35

## 2019-12-13 RX ADMIN — ALBUMIN (HUMAN) 25 G: 0.25 INJECTION, SOLUTION INTRAVENOUS at 16:14

## 2019-12-13 NOTE — DISCHARGE INSTRUCTIONS
[address]  Department of Interventional Radiology  [address] [place] [address] [address] [facility]  [phone] Office  [phone] Fax    PARACENTESIS DISCHARGE INSTRUCTIONS    General Information:  During this procedure, the doctor will insert a needle into the abdomen to drain fluid. After the procedure, you will be able to take a deep breath much easier. The site of the puncture may ooze the first day. This will decrease and eventually stop. Paracentesis (draining fluid from the abdomen) sometimes makes patients hypotensive (low blood pressure). Your doctor may order for you to receive fluids or albumin (a volume booster) during the procedure through an IV site. Home Care Instructions:  Keep the puncture site clean and dry. No tub baths or swimming until puncture site heals. Showering is acceptable. Resume your normal diet, and resume your normal activity slowly and as you tolerate. If you are short of breath, rest. If shortness of breath does not ease, please call your ordering doctor. Fluid can re-accumulate in the chest and/or in the abdomen. If this should occur, your doctor needs to know as you may need to have the procedure done again. Call If:     You should call your Physician and/or the Radiology Nurse if you notice any signs of infection, like pus draining, or if it is swollen or reddened. Also call if you have a fever, or if you are bleeding from the puncture site more than a small amount on the dressing. Call if the puncture site keeps draining fluid. Some oozing is to be expected, but should slow and then stop. Call if you feel like you have pressure in your abdomen. SEEK IMMEDIATE CARE OR CALL 911 IF YOU SUDDENLY HAVE TROUBLE BREATHING, OR IF YOUR LIPS TURN BLUE, OR IF YOU NOTICE BLOOD IN YOUR SPUTUM. Follow-Up Instructions: Please see your ordering doctor as he/she has requested.      If you have any questions about your procedure, please call the Interventional Radiology department at [phone]. After business hours (5pm) and weekends, call the answering service at [phone] and ask for the Radiologist on call to be paged.    .       To Reach Us:        Date: 12/13/2019  Discharging Nurse: Allen Moore RN

## 2019-12-13 NOTE — PROGRESS NOTES
Patient's \"insides\" feel sore and he felt \"winded\" since fluid removed; told him to hang out with us a while until he feels better. VS stable-see flowsheet. Wife at bedside.

## 2019-12-13 NOTE — PROGRESS NOTES
Pt \"feeling better\"; staff began to assist patient in getting up to get in wheelchair to be discharged, when he got dizzy/lightheaded. Pressures dropped to 80's, then 83'V systollically. Pt put in [place]. Dr. Kanu lindquist. Received orders to give fluid resuscitation. IV placed and a 1000ml bag of NS hung.

## 2019-12-13 NOTE — PROGRESS NOTES
Patient states he feels much better now; ready, and insists, on going home. IV removed. D/C instructions went over with wife again.

## 2019-12-14 LAB
ALBUMIN FLD-MCNC: NORMAL G/DL
PROT FLD-MCNC: NORMAL G/DL
SPECIMEN SOURCE FLD: NORMAL
SPECIMEN SOURCE FLD: NORMAL

## 2019-12-16 LAB
BACTERIA SPEC CULT: NORMAL
GRAM STN SPEC: NORMAL
GRAM STN SPEC: NORMAL
SERVICE CMNT-IMP: NORMAL

## 2020-01-31 ENCOUNTER — HOSPITAL ENCOUNTER (OUTPATIENT)
Dept: INTERVENTIONAL RADIOLOGY/VASCULAR | Age: 70
Discharge: HOME | End: 2020-01-31
Attending: INTERNAL MEDICINE
Payer: MEDICARE

## 2020-01-31 VITALS
DIASTOLIC BLOOD PRESSURE: 52 MMHG | TEMPERATURE: 98.1 F | HEART RATE: 120 BPM | OXYGEN SATURATION: 93 % | SYSTOLIC BLOOD PRESSURE: 84 MMHG

## 2020-01-31 DIAGNOSIS — K76.6 PORTAL HYPERTENSION (HCC): ICD-10-CM

## 2020-01-31 LAB
APPEARANCE FLD: CLEAR
COLOR FLD: YELLOW
NUC CELL # FLD: <100 /CU MM
RBC # FLD: 1000 /CU MM
SPECIMEN SOURCE FLD: NORMAL

## 2020-01-31 PROCEDURE — 74011250636 HC RX REV CODE- 250/636: Performed by: PHYSICIAN ASSISTANT

## 2020-01-31 PROCEDURE — 89050 BODY FLUID CELL COUNT: CPT

## 2020-01-31 PROCEDURE — 49083 ABD PARACENTESIS W/IMAGING: CPT

## 2020-01-31 PROCEDURE — P9047 ALBUMIN (HUMAN), 25%, 50ML: HCPCS | Performed by: PHYSICIAN ASSISTANT

## 2020-01-31 PROCEDURE — 77030014146 HC TY THORCENT/PARACENT BD -B

## 2020-01-31 PROCEDURE — 77030010507 HC ADH SKN DERMBND J&J -B

## 2020-01-31 RX ORDER — ALBUMIN HUMAN 250 G/1000ML
50 SOLUTION INTRAVENOUS ONCE
Status: COMPLETED | OUTPATIENT
Start: 2020-01-31 | End: 2020-01-31

## 2020-01-31 RX ORDER — FUROSEMIDE 80 MG/1
100 TABLET ORAL DAILY
COMMUNITY
End: 2021-01-01 | Stop reason: SDUPTHER

## 2020-01-31 RX ORDER — ALBUMIN HUMAN 250 G/1000ML
25 SOLUTION INTRAVENOUS ONCE
Status: COMPLETED | OUTPATIENT
Start: 2020-01-31 | End: 2020-01-31

## 2020-01-31 RX ADMIN — ALBUMIN (HUMAN) 25 G: 0.25 INJECTION, SOLUTION INTRAVENOUS at 12:43

## 2020-01-31 RX ADMIN — ALBUMIN (HUMAN) 50 G: 0.25 INJECTION, SOLUTION INTRAVENOUS at 11:37

## 2020-01-31 NOTE — DISCHARGE INSTRUCTIONS
[address]  Department of Interventional Radiology  [place] [facility]  [phone] Office  [phone] Fax    PARACENTESIS DISCHARGE INSTRUCTIONS    General Information:  During this procedure, the doctor will insert a needle into the abdomen to drain fluid. After the procedure, you will be able to take a deep breath much easier. The site of the puncture may ooze the first day. This will decrease and eventually stop. Paracentesis (draining fluid from the abdomen) sometimes makes patients hypotensive (low blood pressure). Your doctor may order for you to receive fluids or albumin (a volume booster) during the procedure through an IV site. Home Care Instructions:  Keep the puncture site clean and dry. No tub baths or swimming until puncture site heals. Showering is acceptable. Resume your normal diet, and resume your normal activity slowly and as you tolerate. If you are short of breath, rest. If shortness of breath does not ease, please call your ordering doctor. Fluid can re-accumulate in the chest and/or in the abdomen. If this should occur, your doctor needs to know as you may need to have the procedure done again. Call If:     You should call your Physician and/or the Radiology Nurse if you notice any signs of infection, like pus draining, or if it is swollen or reddened. Also call if you have a fever, or if you are bleeding from the puncture site more than a small amount on the dressing. Call if the puncture site keeps draining fluid. Some oozing is to be expected, but should slow and then stop. Call if you feel like you have pressure in your abdomen. SEEK IMMEDIATE CARE OR CALL 911 IF YOU SUDDENLY HAVE TROUBLE BREATHING, OR IF YOUR LIPS TURN BLUE, OR IF YOU NOTICE BLOOD IN YOUR SPUTUM. Follow-Up Instructions: Please see your ordering doctor as he/she has requested. To Reach Us:     If you have any questions about your procedure, please call the Interventional Radiology department at [phone]. After business hours (5pm) and weekends, call the answering service at [phone] and ask for the Radiologist on call to be paged. Si tiene Preguntas acerca del procedimiento, por favor llame al departamento de Radiología Intervencional al [phone]. Después de horas de oficina (5 pm) y los fines de [place], llamar al [facility] [place] al [phone] y pregunte por el Radiologo de [facility].      Date: 1/31/2020  Discharging Nurse: Jennifer Williamson RN

## 2020-03-13 ENCOUNTER — HOSPITAL ENCOUNTER (OUTPATIENT)
Dept: INTERVENTIONAL RADIOLOGY/VASCULAR | Age: 70
Discharge: HOME | End: 2020-03-13
Attending: INTERNAL MEDICINE
Payer: MEDICARE

## 2020-03-13 VITALS
OXYGEN SATURATION: 97 % | SYSTOLIC BLOOD PRESSURE: 100 MMHG | DIASTOLIC BLOOD PRESSURE: 61 MMHG | TEMPERATURE: 97.8 F | RESPIRATION RATE: 18 BRPM | HEART RATE: 85 BPM

## 2020-03-13 DIAGNOSIS — K76.6 HYPERTENSION, PORTAL (HCC): ICD-10-CM

## 2020-03-13 PROCEDURE — P9047 ALBUMIN (HUMAN), 25%, 50ML: HCPCS | Performed by: RADIOLOGY

## 2020-03-13 PROCEDURE — 74011250636 HC RX REV CODE- 250/636: Performed by: RADIOLOGY

## 2020-03-13 PROCEDURE — 49083 ABD PARACENTESIS W/IMAGING: CPT

## 2020-03-13 PROCEDURE — P9045 ALBUMIN (HUMAN), 5%, 250 ML: HCPCS | Performed by: RADIOLOGY

## 2020-03-13 PROCEDURE — 77030014146 HC TY THORCENT/PARACENT BD -B

## 2020-03-13 RX ORDER — ALBUMIN HUMAN 50 G/1000ML
100 SOLUTION INTRAVENOUS ONCE
Status: COMPLETED | OUTPATIENT
Start: 2020-03-13 | End: 2020-03-13

## 2020-03-13 RX ORDER — ALBUMIN HUMAN 250 G/1000ML
50 SOLUTION INTRAVENOUS ONCE
Status: COMPLETED | OUTPATIENT
Start: 2020-03-13 | End: 2020-03-13

## 2020-03-13 RX ADMIN — ALBUMIN (HUMAN) 50 G: 0.25 INJECTION, SOLUTION INTRAVENOUS at 11:07

## 2020-03-13 RX ADMIN — ALBUMIN (HUMAN) 50 G: 12.5 INJECTION, SOLUTION INTRAVENOUS at 10:13

## 2020-03-13 NOTE — PROGRESS NOTES
Interventional Radiology Post Paracentesis/Thoracentesis Note    3/13/2020    Procedure(s): Ultrasound guided Therapeutic Paracentesis Performed with 8 Malay catheter total volume 92242 ml. Preliminary Findings: large clear and yellow. Complications: None    Plan:  Observation, check labs if drawn.           Chest X-Ray:  no    Full dictated report to follow

## 2020-03-13 NOTE — PROCEDURES
Department of Interventional Radiology  [phone]        Interventional Radiology Brief Procedure Note    Patient: Keaton Jj MRN: [id number]  SSN: [id number]    YOB: 1950  Age: 71 y.o. Sex: male      Date of Procedure: 3/13/2020    Pre-Procedure Diagnosis: Recurrent massive ascites. Cirrhosis. Post-Procedure Diagnosis: SAME    Procedure(s): Paracentesis    Brief Description of Procedure: as above    Performed By: Trung Still MD     Assistants: None    Anesthesia:Lidocaine    Estimated Blood Loss: None    Specimens:  None    Implants:  None    Findings: Massive ascites. Complications: None    Recommendations: TIPS or PleurX drain or [place] Shunt.       Follow Up: PRN    Signed By: Trung Still MD     March 13, 2020

## 2020-03-13 NOTE — DISCHARGE INSTRUCTIONS
[address]  Department of Interventional Radiology  [facility]  [phone] Office  [phone] Fax    PARACENTESIS DISCHARGE INSTRUCTIONS    General Information:  During this procedure, the doctor will insert a needle into the abdomen to drain fluid. After the procedure, you will be able to take a deep breath much easier. The site of the puncture may ooze the first day. This will decrease and eventually stop. Paracentesis (draining fluid from the abdomen) sometimes makes patients hypotensive (low blood pressure). Your doctor may order for you to receive fluids or albumin (a volume booster) during the procedure through an IV site. Home Care Instructions:  Keep the puncture site clean and dry. No tub baths or swimming until puncture site heals. Showering is acceptable. Resume your normal diet, and resume your normal activity slowly and as you tolerate. If you are short of breath, rest. If shortness of breath does not ease, please call your ordering doctor. Fluid can re-accumulate in the chest and/or in the abdomen. If this should occur, your doctor needs to know as you may need to have the procedure done again. Call If:     You should call your Physician and/or the Radiology Nurse if you notice any signs of infection, like pus draining, or if it is swollen or reddened. Also call if you have a fever, or if you are bleeding from the puncture site more than a small amount on the dressing. Call if the puncture site keeps draining fluid. Some oozing is to be expected, but should slow and then stop. Call if you feel like you have pressure in your abdomen. SEEK IMMEDIATE CARE OR CALL 911 IF YOU SUDDENLY HAVE TROUBLE BREATHING, OR IF YOUR LIPS TURN BLUE, OR IF YOU NOTICE BLOOD IN YOUR SPUTUM. Follow-Up Instructions: Please see your ordering doctor as he/she has requested. To Reach Us:   If you have any questions about your procedure, please call the Interventional Radiology department at [phone]. After business hours (5pm) and weekends, call the answering service at [phone] and ask for the Radiologist on call to be paged. Interventional Radiology General Nurse Discharge    After general anesthesia or intravenous sedation, for 24 hours or while taking prescription Narcotics:  · Limit your activities  · Do not drive and operate hazardous machinery  · Do not make important personal or business decisions  · Do  not drink alcoholic beverages  · If you have not urinated within 8 hours after discharge, please contact your surgeon on call. * Please give a list of your current medications to your Primary Care Provider. * Please update this list whenever your medications are discontinued, doses are     changed, or new medications (including over-the-counter products) are added. * Please carry medication information at all times in case of emergency situations. These are general instructions for a healthy lifestyle:    No smoking/ No tobacco products/ Avoid exposure to second hand smoke  Surgeon General's Warning:  Quitting smoking now greatly reduces serious risk to your health. Obesity, smoking, and sedentary lifestyle greatly increases your risk for illness  A healthy diet, regular physical exercise & weight monitoring are important for maintaining a healthy lifestyle    You may be retaining fluid if you have a history of heart failure or if you experience any of the following symptoms:  Weight gain of 3 pounds or more overnight or 5 pounds in a week, increased swelling in our hands or feet or shortness of breath while lying flat in bed. Please call your doctor as soon as you notice any of these symptoms; do not wait until your next office visit.     Recognize signs and symptoms of STROKE:  F-face looks uneven    A-arms unable to move or move unevenly    S-speech slurred or non-existent    T-time-call 911 as soon as signs and symptoms begin-DO NOT go       Back to bed or wait to see if you get better-TIME IS BRAIN.         Date: 3/13/2020  Discharging Nurse: Zaina Chavira RN

## 2020-04-16 NOTE — PROGRESS NOTES
Screening for COVID-19 During Preassessment    1) Do you currently have signs or symptoms of a respiratory infection, such as fever, cough, shortness of breath or sore throat?  no    2) In the last 14 days have you had contact with any of the following:   A) Someone with confirmed or presumptive diagnosis of COVID-19? NO    Or B) Someone under investigation for COVID-19? NO    Or  C) Someone who has been diagnosed with COVID-19? NO    3) In the last 14 days have you traveled or has someone in your home traveled to [place], [facility], [place], [place], [place], [place], [place], or [place]?    No

## 2020-04-17 ENCOUNTER — HOSPITAL ENCOUNTER (OUTPATIENT)
Dept: INTERVENTIONAL RADIOLOGY/VASCULAR | Age: 70
Discharge: HOME | End: 2020-04-17
Attending: INTERNAL MEDICINE
Payer: MEDICARE

## 2020-04-17 VITALS
HEART RATE: 79 BPM | RESPIRATION RATE: 14 BRPM | DIASTOLIC BLOOD PRESSURE: 71 MMHG | SYSTOLIC BLOOD PRESSURE: 109 MMHG | OXYGEN SATURATION: 97 % | TEMPERATURE: 98.2 F

## 2020-04-17 DIAGNOSIS — K76.6 PORTAL HYPERTENSION (HCC): ICD-10-CM

## 2020-04-17 PROCEDURE — 77030010507 HC ADH SKN DERMBND J&J -B

## 2020-04-17 PROCEDURE — 49083 ABD PARACENTESIS W/IMAGING: CPT

## 2020-04-17 PROCEDURE — 77030014146 HC TY THORCENT/PARACENT BD -B

## 2020-04-17 PROCEDURE — 74011250636 HC RX REV CODE- 250/636: Performed by: PHYSICIAN ASSISTANT

## 2020-04-17 PROCEDURE — P9047 ALBUMIN (HUMAN), 25%, 50ML: HCPCS | Performed by: PHYSICIAN ASSISTANT

## 2020-04-17 RX ORDER — ALBUMIN HUMAN 250 G/1000ML
12.5 SOLUTION INTRAVENOUS
Status: DISCONTINUED | OUTPATIENT
Start: 2020-04-17 | End: 2020-04-21 | Stop reason: HOSPADM

## 2020-04-17 RX ADMIN — ALBUMIN (HUMAN) 12.5 G: 0.25 INJECTION, SOLUTION INTRAVENOUS at 09:35

## 2020-04-17 RX ADMIN — ALBUMIN (HUMAN) 12.5 G: 0.25 INJECTION, SOLUTION INTRAVENOUS at 09:15

## 2020-04-17 RX ADMIN — ALBUMIN (HUMAN) 12.5 G: 0.25 INJECTION, SOLUTION INTRAVENOUS at 08:36

## 2020-04-17 RX ADMIN — ALBUMIN (HUMAN) 12.5 G: 0.25 INJECTION, SOLUTION INTRAVENOUS at 09:44

## 2020-04-17 RX ADMIN — ALBUMIN (HUMAN) 12.5 G: 0.25 INJECTION, SOLUTION INTRAVENOUS at 09:52

## 2020-04-17 RX ADMIN — ALBUMIN (HUMAN) 12.5 G: 0.25 INJECTION, SOLUTION INTRAVENOUS at 09:25

## 2020-04-17 NOTE — PROGRESS NOTES
Patient is Alert and discharge instructions given and received. Total ascites fluid removed: 16,700 ml and 6 bottles of albumin given.

## 2020-04-17 NOTE — DISCHARGE INSTRUCTIONS
[address]  Department of Interventional Radiology  [address] [place] [address] [address] [facility]  [phone] Office  [phone] Fax    PARACENTESIS DISCHARGE INSTRUCTIONS    General Information:  During this procedure, the doctor will insert a needle into the abdomen to drain fluid. After the procedure, you will be able to take a deep breath much easier. The site of the puncture may ooze the first day. This will decrease and eventually stop. Paracentesis (draining fluid from the abdomen) sometimes makes patients hypotensive (low blood pressure). Your doctor may order for you to receive fluids or albumin (a volume booster) during the procedure through an IV site. Home Care Instructions:  Keep the puncture site clean and dry. No tub baths or swimming until puncture site heals. Showering is acceptable. Resume your normal diet, and resume your normal activity slowly and as you tolerate. If you are short of breath, rest. If shortness of breath does not ease, please call your ordering doctor. Fluid can re-accumulate in the chest and/or in the abdomen. If this should occur, your doctor needs to know as you may need to have the procedure done again. Call If:     You should call your Physician and/or the Radiology Nurse if you notice any signs of infection, like pus draining, or if it is swollen or reddened. Also call if you have a fever, or if you are bleeding from the puncture site more than a small amount on the dressing. Call if the puncture site keeps draining fluid. Some oozing is to be expected, but should slow and then stop. Call if you feel like you have pressure in your abdomen. SEEK IMMEDIATE CARE OR CALL 911 IF YOU SUDDENLY HAVE TROUBLE BREATHING, OR IF YOUR LIPS TURN BLUE, OR IF YOU NOTICE BLOOD IN YOUR SPUTUM. Follow-Up Instructions: Please see your ordering doctor as he/she has requested. To Reach Us:   If you have any questions about your procedure, please call the Interventional Radiology department at [phone]. After business hours (5pm) and weekends, call the answering service at [phone] and ask for the Radiologist on call to be paged. Interventional Radiology General Nurse Discharge    After general anesthesia or intravenous sedation, for 24 hours or while taking prescription Narcotics:  · Limit your activities  · Do not drive and operate hazardous machinery  · Do not make important personal or business decisions  · Do  not drink alcoholic beverages  · If you have not urinated within 8 hours after discharge, please contact your surgeon on call. * Please give a list of your current medications to your Primary Care Provider. * Please update this list whenever your medications are discontinued, doses are     changed, or new medications (including over-the-counter products) are added. * Please carry medication information at all times in case of emergency situations. These are general instructions for a healthy lifestyle:    No smoking/ No tobacco products/ Avoid exposure to second hand smoke  Surgeon General's Warning:  Quitting smoking now greatly reduces serious risk to your health. Obesity, smoking, and sedentary lifestyle greatly increases your risk for illness  A healthy diet, regular physical exercise & weight monitoring are important for maintaining a healthy lifestyle    You may be retaining fluid if you have a history of heart failure or if you experience any of the following symptoms:  Weight gain of 3 pounds or more overnight or 5 pounds in a week, increased swelling in our hands or feet or shortness of breath while lying flat in bed. Please call your doctor as soon as you notice any of these symptoms; do not wait until your next office visit.     Recognize signs and symptoms of STROKE:  F-face looks uneven    A-arms unable to move or move unevenly    S-speech slurred or non-existent    T-time-call 911 as soon as signs and symptoms begin-DO NOT go       Back to bed or wait to see if you get better-TIME IS BRAIN.         Date: 4/17/2020  Discharging Nurse: Libia Rai

## 2020-06-18 ENCOUNTER — HOSPITAL ENCOUNTER (OUTPATIENT)
Dept: INTERVENTIONAL RADIOLOGY/VASCULAR | Age: 70
Discharge: HOME | End: 2020-06-18
Attending: INTERNAL MEDICINE
Payer: MEDICARE

## 2020-06-18 VITALS
RESPIRATION RATE: 20 BRPM | DIASTOLIC BLOOD PRESSURE: 69 MMHG | SYSTOLIC BLOOD PRESSURE: 119 MMHG | HEART RATE: 70 BPM | TEMPERATURE: 98.5 F | OXYGEN SATURATION: 99 %

## 2020-06-18 DIAGNOSIS — K76.6 HYPERTENSION, PORTAL (HCC): ICD-10-CM

## 2020-06-18 PROCEDURE — 74011250636 HC RX REV CODE- 250/636: Performed by: PHYSICIAN ASSISTANT

## 2020-06-18 PROCEDURE — P9047 ALBUMIN (HUMAN), 25%, 50ML: HCPCS | Performed by: PHYSICIAN ASSISTANT

## 2020-06-18 PROCEDURE — 77030014146 HC TY THORCENT/PARACENT BD -B

## 2020-06-18 PROCEDURE — 77030010507 HC ADH SKN DERMBND J&J -B

## 2020-06-18 PROCEDURE — 49083 ABD PARACENTESIS W/IMAGING: CPT

## 2020-06-18 RX ORDER — ALBUMIN HUMAN 250 G/1000ML
25 SOLUTION INTRAVENOUS ONCE
Status: COMPLETED | OUTPATIENT
Start: 2020-06-18 | End: 2020-06-18

## 2020-06-18 RX ADMIN — ALBUMIN (HUMAN) 25 G: 0.25 INJECTION, SOLUTION INTRAVENOUS at 09:56

## 2020-06-18 NOTE — DISCHARGE INSTRUCTIONS
[address]  Department of Interventional Radiology  [facility]  [phone] Office  [phone] Fax    PARACENTESIS DISCHARGE INSTRUCTIONS    General Information:  During this procedure, the doctor will insert a needle into the abdomen to drain fluid. After the procedure, you will be able to take a deep breath much easier. The site of the puncture may ooze the first day. This will decrease and eventually stop. Paracentesis (draining fluid from the abdomen) sometimes makes patients hypotensive (low blood pressure). Your doctor may order for you to receive fluids or albumin (a volume booster) during the procedure through an IV site. Home Care Instructions:  Keep the puncture site clean and dry. No tub baths or swimming until puncture site heals. Showering is acceptable. Resume your normal diet, and resume your normal activity slowly and as you tolerate. If you are short of breath, rest. If shortness of breath does not ease, please call your ordering doctor. Fluid can re-accumulate in the chest and/or in the abdomen. If this should occur, your doctor needs to know as you may need to have the procedure done again. Call If:     You should call your Physician and/or the Radiology Nurse if you notice any signs of infection, like pus draining, or if it is swollen or reddened. Also call if you have a fever, or if you are bleeding from the puncture site more than a small amount on the dressing. Call if the puncture site keeps draining fluid. Some oozing is to be expected, but should slow and then stop. Call if you feel like you have pressure in your abdomen. SEEK IMMEDIATE CARE OR CALL 911 IF YOU SUDDENLY HAVE TROUBLE BREATHING, OR IF YOUR LIPS TURN BLUE, OR IF YOU NOTICE BLOOD IN YOUR SPUTUM. Follow-Up Instructions: Please see your ordering doctor as he/she has requested. To Reach Us:     If you have any questions about your procedure, please call the Interventional Radiology department at [phone]. After business hours (5pm) and weekends, call the answering service at [phone] and ask for the Radiologist on call to be paged. Si tiene Preguntas acerca del procedimiento, por favor llame al departamento de Radiología Intervencional al [phone]. Después de horas de oficina (5 pm) y los fines de [place], llamar al [facility] al [phone] y pregunte por el Radiologo de [facility].        Date: 6/18/2020  Discharging Nurse: Grzegorz Magallon RN

## 2021-01-01 ENCOUNTER — HOME CARE VISIT (OUTPATIENT)
Dept: HOSPICE | Facility: HOSPICE | Age: 71
End: 2021-01-01
Payer: MEDICARE

## 2021-01-01 ENCOUNTER — HOSPITAL ENCOUNTER (INPATIENT)
Age: 71
LOS: 3 days | Discharge: HOSPICE | DRG: 291 | End: 2021-07-07
Attending: EMERGENCY MEDICINE | Admitting: INTERNAL MEDICINE
Payer: MEDICARE

## 2021-01-01 ENCOUNTER — HOSPITAL ENCOUNTER (OUTPATIENT)
Dept: INTERVENTIONAL RADIOLOGY/VASCULAR | Age: 71
Discharge: HOME | End: 2021-05-27
Attending: INTERNAL MEDICINE
Payer: MEDICARE

## 2021-01-01 ENCOUNTER — APPOINTMENT (OUTPATIENT)
Dept: INTERVENTIONAL RADIOLOGY/VASCULAR | Age: 71
DRG: 291 | End: 2021-01-01
Attending: STUDENT IN AN ORGANIZED HEALTH CARE EDUCATION/TRAINING PROGRAM
Payer: MEDICARE

## 2021-01-01 ENCOUNTER — HOSPITAL ENCOUNTER (OUTPATIENT)
Dept: INTERVENTIONAL RADIOLOGY/VASCULAR | Age: 71
Discharge: HOME | End: 2021-03-26
Attending: INTERNAL MEDICINE
Payer: MEDICARE

## 2021-01-01 ENCOUNTER — HOME CARE VISIT (OUTPATIENT)
Dept: SCHEDULING | Facility: HOME HEALTH | Age: 71
End: 2021-01-01
Payer: MEDICARE

## 2021-01-01 ENCOUNTER — HOSPITAL ENCOUNTER (OUTPATIENT)
Dept: INTERVENTIONAL RADIOLOGY/VASCULAR | Age: 71
Discharge: HOME | End: 2021-05-04
Attending: INTERNAL MEDICINE

## 2021-01-01 ENCOUNTER — APPOINTMENT (OUTPATIENT)
Dept: CT IMAGING | Age: 71
End: 2021-01-01
Attending: EMERGENCY MEDICINE
Payer: MEDICARE

## 2021-01-01 ENCOUNTER — HOSPITAL ENCOUNTER (EMERGENCY)
Age: 71
Discharge: HOME | End: 2021-01-11
Attending: STUDENT IN AN ORGANIZED HEALTH CARE EDUCATION/TRAINING PROGRAM
Payer: MEDICARE

## 2021-01-01 ENCOUNTER — HOSPICE ADMISSION (OUTPATIENT)
Dept: HOSPICE | Facility: HOSPICE | Age: 71
End: 2021-01-01

## 2021-01-01 ENCOUNTER — APPOINTMENT (OUTPATIENT)
Dept: ULTRASOUND IMAGING | Age: 71
End: 2021-01-01
Attending: EMERGENCY MEDICINE
Payer: MEDICARE

## 2021-01-01 ENCOUNTER — HOSPICE ADMISSION (OUTPATIENT)
Dept: HOSPICE | Facility: HOSPICE | Age: 71
End: 2021-01-01
Payer: MEDICARE

## 2021-01-01 ENCOUNTER — HOSPITAL ENCOUNTER (OUTPATIENT)
Dept: INTERVENTIONAL RADIOLOGY/VASCULAR | Age: 71
Discharge: HOME | End: 2021-04-30
Attending: INTERNAL MEDICINE
Payer: MEDICARE

## 2021-01-01 ENCOUNTER — HOSPITAL ENCOUNTER (EMERGENCY)
Age: 71
Discharge: HOME | End: 2021-01-08
Attending: EMERGENCY MEDICINE
Payer: MEDICARE

## 2021-01-01 ENCOUNTER — APPOINTMENT (OUTPATIENT)
Dept: GENERAL RADIOLOGY | Age: 71
DRG: 291 | End: 2021-01-01
Attending: EMERGENCY MEDICINE
Payer: MEDICARE

## 2021-01-01 VITALS
WEIGHT: 210.8 LBS | SYSTOLIC BLOOD PRESSURE: 93 MMHG | HEIGHT: 71 IN | BODY MASS INDEX: 29.51 KG/M2 | TEMPERATURE: 97.6 F | RESPIRATION RATE: 20 BRPM | OXYGEN SATURATION: 94 % | DIASTOLIC BLOOD PRESSURE: 68 MMHG | HEART RATE: 108 BPM

## 2021-01-01 VITALS
RESPIRATION RATE: 16 BRPM | OXYGEN SATURATION: 94 % | SYSTOLIC BLOOD PRESSURE: 131 MMHG | WEIGHT: 219 LBS | HEIGHT: 71 IN | DIASTOLIC BLOOD PRESSURE: 72 MMHG | HEART RATE: 112 BPM | BODY MASS INDEX: 30.66 KG/M2 | TEMPERATURE: 98.4 F

## 2021-01-01 VITALS
TEMPERATURE: 97.8 F | SYSTOLIC BLOOD PRESSURE: 139 MMHG | DIASTOLIC BLOOD PRESSURE: 65 MMHG | HEIGHT: 71 IN | RESPIRATION RATE: 16 BRPM | OXYGEN SATURATION: 98 % | BODY MASS INDEX: 24.5 KG/M2 | HEART RATE: 71 BPM | WEIGHT: 175 LBS

## 2021-01-01 VITALS
HEART RATE: 80 BPM | WEIGHT: 212 LBS | DIASTOLIC BLOOD PRESSURE: 80 MMHG | HEIGHT: 71 IN | BODY MASS INDEX: 29.68 KG/M2 | SYSTOLIC BLOOD PRESSURE: 100 MMHG | RESPIRATION RATE: 16 BRPM

## 2021-01-01 VITALS
RESPIRATION RATE: 16 BRPM | SYSTOLIC BLOOD PRESSURE: 100 MMHG | HEART RATE: 78 BPM | DIASTOLIC BLOOD PRESSURE: 62 MMHG | TEMPERATURE: 98.4 F

## 2021-01-01 VITALS
HEART RATE: 100 BPM | DIASTOLIC BLOOD PRESSURE: 86 MMHG | HEIGHT: 71 IN | WEIGHT: 180 LBS | SYSTOLIC BLOOD PRESSURE: 118 MMHG | BODY MASS INDEX: 25.2 KG/M2 | OXYGEN SATURATION: 94 % | TEMPERATURE: 97.7 F | RESPIRATION RATE: 20 BRPM

## 2021-01-01 VITALS — HEART RATE: 88 BPM | DIASTOLIC BLOOD PRESSURE: 60 MMHG | SYSTOLIC BLOOD PRESSURE: 90 MMHG

## 2021-01-01 VITALS
HEIGHT: 71 IN | SYSTOLIC BLOOD PRESSURE: 105 MMHG | TEMPERATURE: 97.7 F | DIASTOLIC BLOOD PRESSURE: 59 MMHG | BODY MASS INDEX: 24.5 KG/M2 | OXYGEN SATURATION: 100 % | RESPIRATION RATE: 18 BRPM | WEIGHT: 175 LBS | HEART RATE: 52 BPM

## 2021-01-01 DIAGNOSIS — J96.01 ACUTE RESPIRATORY FAILURE WITH HYPOXIA AND HYPERCAPNIA (HCC): ICD-10-CM

## 2021-01-01 DIAGNOSIS — R06.00 DYSPNEA, UNSPECIFIED TYPE: ICD-10-CM

## 2021-01-01 DIAGNOSIS — R33.9 URINARY RETENTION: Primary | ICD-10-CM

## 2021-01-01 DIAGNOSIS — J90 PLEURAL EFFUSION: ICD-10-CM

## 2021-01-01 DIAGNOSIS — R60.9 EDEMA, UNSPECIFIED TYPE: ICD-10-CM

## 2021-01-01 DIAGNOSIS — K70.31 ALCOHOLIC CIRRHOSIS OF LIVER WITH ASCITES (HCC): ICD-10-CM

## 2021-01-01 DIAGNOSIS — J96.02 ACUTE RESPIRATORY FAILURE WITH HYPOXIA AND HYPERCAPNIA (HCC): ICD-10-CM

## 2021-01-01 DIAGNOSIS — R53.81 DEBILITY: ICD-10-CM

## 2021-01-01 DIAGNOSIS — R60.1 ANASARCA: ICD-10-CM

## 2021-01-01 DIAGNOSIS — I35.0 SEVERE AORTIC STENOSIS: ICD-10-CM

## 2021-01-01 DIAGNOSIS — K70.31 ASCITES DUE TO ALCOHOLIC CIRRHOSIS (HCC): ICD-10-CM

## 2021-01-01 DIAGNOSIS — N17.9 ACUTE RENAL FAILURE SUPERIMPOSED ON STAGE 3A CHRONIC KIDNEY DISEASE, UNSPECIFIED ACUTE RENAL FAILURE TYPE (HCC): ICD-10-CM

## 2021-01-01 DIAGNOSIS — R18.8 OTHER ASCITES: ICD-10-CM

## 2021-01-01 DIAGNOSIS — J90 PLEURAL EFFUSION ON RIGHT: ICD-10-CM

## 2021-01-01 DIAGNOSIS — K74.60 UNSPECIFIED CIRRHOSIS OF LIVER (HCC): ICD-10-CM

## 2021-01-01 DIAGNOSIS — K76.6 PORTAL HYPERTENSION (HCC): ICD-10-CM

## 2021-01-01 DIAGNOSIS — Z46.6 URINARY CATHETER CHANGE REQUIRED: Primary | ICD-10-CM

## 2021-01-01 DIAGNOSIS — N18.32 STAGE 3B CHRONIC KIDNEY DISEASE (HCC): ICD-10-CM

## 2021-01-01 DIAGNOSIS — N41.0 ACUTE PROSTATITIS: ICD-10-CM

## 2021-01-01 DIAGNOSIS — N18.31 ACUTE RENAL FAILURE SUPERIMPOSED ON STAGE 3A CHRONIC KIDNEY DISEASE, UNSPECIFIED ACUTE RENAL FAILURE TYPE (HCC): ICD-10-CM

## 2021-01-01 DIAGNOSIS — Z66 DNR (DO NOT RESUSCITATE): ICD-10-CM

## 2021-01-01 DIAGNOSIS — Z51.5 HOSPICE CARE PATIENT: ICD-10-CM

## 2021-01-01 DIAGNOSIS — J96.02 ACUTE RESPIRATORY FAILURE WITH HYPERCAPNIA (HCC): ICD-10-CM

## 2021-01-01 DIAGNOSIS — R18.8 ASCITES: ICD-10-CM

## 2021-01-01 DIAGNOSIS — R53.83 FATIGUE, UNSPECIFIED TYPE: ICD-10-CM

## 2021-01-01 DIAGNOSIS — I35.0 AORTIC VALVE STENOSIS, ETIOLOGY OF CARDIAC VALVE DISEASE UNSPECIFIED: ICD-10-CM

## 2021-01-01 DIAGNOSIS — I50.23 ACUTE ON CHRONIC SYSTOLIC CONGESTIVE HEART FAILURE (HCC): Primary | ICD-10-CM

## 2021-01-01 LAB
ALBUMIN FLD-MCNC: 0.9 G/DL
ALBUMIN SERPL-MCNC: 2.2 G/DL (ref 3.2–4.6)
ALBUMIN SERPL-MCNC: 2.5 G/DL (ref 3.2–4.6)
ALBUMIN SERPL-MCNC: 3 G/DL (ref 3.2–4.6)
ALBUMIN SERPL-MCNC: 3.2 G/DL (ref 3.2–4.6)
ALBUMIN/GLOB SERPL: 0.5 {RATIO} (ref 1.2–3.5)
ALBUMIN/GLOB SERPL: 0.5 {RATIO} (ref 1.2–3.5)
ALBUMIN/GLOB SERPL: 0.7 {RATIO} (ref 1.2–3.5)
ALBUMIN/GLOB SERPL: 0.7 {RATIO} (ref 1.2–3.5)
ALP SERPL-CCNC: 182 U/L (ref 50–136)
ALP SERPL-CCNC: 197 U/L (ref 50–136)
ALP SERPL-CCNC: 271 U/L (ref 50–136)
ALP SERPL-CCNC: 278 U/L (ref 50–136)
ALT SERPL-CCNC: 12 U/L (ref 12–65)
ALT SERPL-CCNC: 14 U/L (ref 12–65)
ALT SERPL-CCNC: 18 U/L (ref 12–65)
ALT SERPL-CCNC: 20 U/L (ref 12–65)
ANION GAP SERPL CALC-SCNC: 10 MMOL/L (ref 7–16)
ANION GAP SERPL CALC-SCNC: 11 MMOL/L (ref 7–16)
ANION GAP SERPL CALC-SCNC: 6 MMOL/L (ref 7–16)
ANION GAP SERPL CALC-SCNC: 6 MMOL/L (ref 7–16)
ANION GAP SERPL CALC-SCNC: 7 MMOL/L (ref 7–16)
ANION GAP SERPL CALC-SCNC: 8 MMOL/L (ref 7–16)
APPEARANCE FLD: CLEAR
APPEARANCE FLD: NORMAL
APPEARANCE UR: ABNORMAL
APPEARANCE UR: ABNORMAL
ARTERIAL PATENCY WRIST A: ABNORMAL
AST SERPL-CCNC: 16 U/L (ref 15–37)
AST SERPL-CCNC: 21 U/L (ref 15–37)
AST SERPL-CCNC: 21 U/L (ref 15–37)
AST SERPL-CCNC: 24 U/L (ref 15–37)
ATRIAL RATE: 90 BPM
BACTERIA SPEC CULT: ABNORMAL
BACTERIA SPEC CULT: NORMAL
BACTERIA URNS QL MICRO: ABNORMAL /HPF
BACTERIA URNS QL MICRO: ABNORMAL /HPF
BASE EXCESS BLD CALC-SCNC: 3 MMOL/L
BASOPHILS # BLD: 0 K/UL (ref 0–0.2)
BASOPHILS NFR BLD: 0 % (ref 0–2)
BDY SITE: ABNORMAL
BILIRUB SERPL-MCNC: 0.4 MG/DL (ref 0.2–1.1)
BILIRUB SERPL-MCNC: 0.5 MG/DL (ref 0.2–1.1)
BILIRUB SERPL-MCNC: 0.6 MG/DL (ref 0.2–1.1)
BILIRUB SERPL-MCNC: 0.7 MG/DL (ref 0.2–1.1)
BILIRUB UR QL: NEGATIVE
BILIRUB UR QL: NEGATIVE
BNP SERPL-MCNC: ABNORMAL PG/ML (ref 5–125)
BUN SERPL-MCNC: 44 MG/DL (ref 8–23)
BUN SERPL-MCNC: 44 MG/DL (ref 8–23)
BUN SERPL-MCNC: 45 MG/DL (ref 8–23)
BUN SERPL-MCNC: 48 MG/DL (ref 8–23)
BUN SERPL-MCNC: 49 MG/DL (ref 8–23)
BUN SERPL-MCNC: 54 MG/DL (ref 8–23)
CALCIUM SERPL-MCNC: 8.5 MG/DL (ref 8.3–10.4)
CALCIUM SERPL-MCNC: 8.5 MG/DL (ref 8.3–10.4)
CALCIUM SERPL-MCNC: 8.7 MG/DL (ref 8.3–10.4)
CALCIUM SERPL-MCNC: 8.8 MG/DL (ref 8.3–10.4)
CALCIUM SERPL-MCNC: 8.9 MG/DL (ref 8.3–10.4)
CALCIUM SERPL-MCNC: 9.5 MG/DL (ref 8.3–10.4)
CALCULATED P AXIS, ECG09: 85 DEGREES
CALCULATED R AXIS, ECG10: 89 DEGREES
CALCULATED T AXIS, ECG11: 159 DEGREES
CASTS URNS QL MICRO: 0 /LPF
CASTS URNS QL MICRO: 0 /LPF
CHLORIDE SERPL-SCNC: 104 MMOL/L (ref 98–107)
CHLORIDE SERPL-SCNC: 105 MMOL/L (ref 98–107)
CHLORIDE SERPL-SCNC: 105 MMOL/L (ref 98–107)
CHLORIDE SERPL-SCNC: 106 MMOL/L (ref 98–107)
CHLORIDE SERPL-SCNC: 106 MMOL/L (ref 98–107)
CHLORIDE SERPL-SCNC: 114 MMOL/L (ref 98–107)
CO2 SERPL-SCNC: 16 MMOL/L (ref 21–32)
CO2 SERPL-SCNC: 17 MMOL/L (ref 21–32)
CO2 SERPL-SCNC: 28 MMOL/L (ref 21–32)
CO2 SERPL-SCNC: 29 MMOL/L (ref 21–32)
CO2 SERPL-SCNC: 32 MMOL/L (ref 21–32)
CO2 SERPL-SCNC: 32 MMOL/L (ref 21–32)
COLOR FLD: YELLOW
COLOR FLD: YELLOW
COLOR UR: YELLOW
COLOR UR: YELLOW
COVID-19 RAPID TEST, COVR: NOT DETECTED
CREAT SERPL-MCNC: 1.59 MG/DL (ref 0.8–1.5)
CREAT SERPL-MCNC: 1.62 MG/DL (ref 0.8–1.5)
CREAT SERPL-MCNC: 1.68 MG/DL (ref 0.8–1.5)
CREAT SERPL-MCNC: 1.84 MG/DL (ref 0.8–1.5)
CREAT SERPL-MCNC: 1.92 MG/DL (ref 0.8–1.5)
CREAT SERPL-MCNC: 2.09 MG/DL (ref 0.8–1.5)
CRYSTALS URNS QL MICRO: 0 /LPF
CRYSTALS URNS QL MICRO: 0 /LPF
DIAGNOSIS, 93000: NORMAL
DIFFERENTIAL METHOD BLD: ABNORMAL
EOSINOPHIL # BLD: 0 K/UL (ref 0–0.8)
EOSINOPHIL # BLD: 0 K/UL (ref 0–0.8)
EOSINOPHIL # BLD: 0.1 K/UL (ref 0–0.8)
EOSINOPHIL NFR BLD: 0 % (ref 0.5–7.8)
EOSINOPHIL NFR BLD: 1 % (ref 0.5–7.8)
EPI CELLS #/AREA URNS HPF: ABNORMAL /HPF
EPI CELLS #/AREA URNS HPF: ABNORMAL /HPF
ERYTHROCYTE [DISTWIDTH] IN BLOOD BY AUTOMATED COUNT: 16.2 % (ref 11.9–14.6)
ERYTHROCYTE [DISTWIDTH] IN BLOOD BY AUTOMATED COUNT: 17 % (ref 11.9–14.6)
ERYTHROCYTE [DISTWIDTH] IN BLOOD BY AUTOMATED COUNT: 17 % (ref 11.9–14.6)
ERYTHROCYTE [DISTWIDTH] IN BLOOD BY AUTOMATED COUNT: 17.1 % (ref 11.9–14.6)
ERYTHROCYTE [DISTWIDTH] IN BLOOD BY AUTOMATED COUNT: 17.2 % (ref 11.9–14.6)
ERYTHROCYTE [DISTWIDTH] IN BLOOD BY AUTOMATED COUNT: 17.4 % (ref 11.9–14.6)
ETHANOL SERPL-MCNC: <3 MG/DL
GAS FLOW.O2 O2 DELIVERY SYS: ABNORMAL L/MIN
GLOBULIN SER CALC-MCNC: 4.5 G/DL (ref 2.3–3.5)
GLOBULIN SER CALC-MCNC: 4.7 G/DL (ref 2.3–3.5)
GLOBULIN SER CALC-MCNC: 4.8 G/DL (ref 2.3–3.5)
GLOBULIN SER CALC-MCNC: 5.1 G/DL (ref 2.3–3.5)
GLUCOSE FLD-MCNC: <1 MG/DL
GLUCOSE SERPL-MCNC: 85 MG/DL (ref 65–100)
GLUCOSE SERPL-MCNC: 85 MG/DL (ref 65–100)
GLUCOSE SERPL-MCNC: 88 MG/DL (ref 65–100)
GLUCOSE SERPL-MCNC: 91 MG/DL (ref 65–100)
GLUCOSE SERPL-MCNC: 96 MG/DL (ref 65–100)
GLUCOSE SERPL-MCNC: 97 MG/DL (ref 65–100)
GLUCOSE UR STRIP.AUTO-MCNC: NEGATIVE MG/DL
GLUCOSE UR STRIP.AUTO-MCNC: NEGATIVE MG/DL
GRAM STN SPEC: ABNORMAL
GRAM STN SPEC: ABNORMAL
GRAM STN SPEC: NORMAL
GRAM STN SPEC: NORMAL
HCO3 BLD-SCNC: 29.9 MMOL/L (ref 22–26)
HCT VFR BLD AUTO: 24.3 % (ref 41.1–50.3)
HCT VFR BLD AUTO: 25.8 % (ref 41.1–50.3)
HCT VFR BLD AUTO: 26.5 % (ref 41.1–50.3)
HCT VFR BLD AUTO: 27.7 % (ref 41.1–50.3)
HCT VFR BLD AUTO: 28.3 % (ref 41.1–50.3)
HCT VFR BLD AUTO: 30 % (ref 41.1–50.3)
HGB BLD-MCNC: 7.3 G/DL (ref 13.6–17.2)
HGB BLD-MCNC: 7.5 G/DL (ref 13.6–17.2)
HGB BLD-MCNC: 8 G/DL (ref 13.6–17.2)
HGB BLD-MCNC: 8.2 G/DL (ref 13.6–17.2)
HGB BLD-MCNC: 8.4 G/DL (ref 13.6–17.2)
HGB BLD-MCNC: 8.9 G/DL (ref 13.6–17.2)
HGB UR QL STRIP: ABNORMAL
HGB UR QL STRIP: ABNORMAL
IMM GRANULOCYTES # BLD AUTO: 0 K/UL (ref 0–0.5)
IMM GRANULOCYTES # BLD AUTO: 0.1 K/UL (ref 0–0.5)
IMM GRANULOCYTES # BLD AUTO: 0.2 K/UL (ref 0–0.5)
IMM GRANULOCYTES # BLD AUTO: 0.3 K/UL (ref 0–0.5)
IMM GRANULOCYTES NFR BLD AUTO: 0 % (ref 0–5)
IMM GRANULOCYTES NFR BLD AUTO: 0 % (ref 0–5)
IMM GRANULOCYTES NFR BLD AUTO: 1 % (ref 0–5)
IMM GRANULOCYTES NFR BLD AUTO: 3 % (ref 0–5)
INR PPP: 1.2
KETONES UR QL STRIP.AUTO: NEGATIVE MG/DL
KETONES UR QL STRIP.AUTO: NEGATIVE MG/DL
LACTATE SERPL-SCNC: 0.8 MMOL/L (ref 0.4–2)
LACTATE SERPL-SCNC: 1.8 MMOL/L (ref 0.4–2)
LACTATE SERPL-SCNC: 2 MMOL/L (ref 0.4–2)
LDH FLD L TO P-CCNC: 87 U/L
LDH SERPL L TO P-CCNC: 117 U/L (ref 110–210)
LDH SERPL L TO P-CCNC: 80 IU/L
LEUKOCYTE ESTERASE UR QL STRIP.AUTO: ABNORMAL
LEUKOCYTE ESTERASE UR QL STRIP.AUTO: ABNORMAL
LYMPHOCYTES # BLD: 0.5 K/UL (ref 0.5–4.6)
LYMPHOCYTES # BLD: 0.6 K/UL (ref 0.5–4.6)
LYMPHOCYTES # BLD: 1 K/UL (ref 0.5–4.6)
LYMPHOCYTES # BLD: 1.1 K/UL (ref 0.5–4.6)
LYMPHOCYTES NFR BLD: 11 % (ref 13–44)
LYMPHOCYTES NFR BLD: 5 % (ref 13–44)
LYMPHOCYTES NFR BLD: 6 % (ref 13–44)
LYMPHOCYTES NFR BLD: 7 % (ref 13–44)
LYMPHOCYTES NFR BLD: 8 % (ref 13–44)
LYMPHOCYTES NFR BLD: 9 % (ref 13–44)
LYMPHOCYTES NFR BRONCH MANUAL: 3 %
MACROPHAGES NFR BRONCH MANUAL: 1 %
MAGNESIUM SERPL-MCNC: 1.7 MG/DL (ref 1.8–2.4)
MAGNESIUM SERPL-MCNC: 1.9 MG/DL (ref 1.8–2.4)
MAGNESIUM SERPL-MCNC: 2.1 MG/DL (ref 1.8–2.4)
MCH RBC QN AUTO: 27.1 PG (ref 26.1–32.9)
MCH RBC QN AUTO: 27.2 PG (ref 26.1–32.9)
MCH RBC QN AUTO: 27.2 PG (ref 26.1–32.9)
MCH RBC QN AUTO: 27.5 PG (ref 26.1–32.9)
MCH RBC QN AUTO: 27.5 PG (ref 26.1–32.9)
MCH RBC QN AUTO: 27.6 PG (ref 26.1–32.9)
MCHC RBC AUTO-ENTMCNC: 28.3 G/DL (ref 31.4–35)
MCHC RBC AUTO-ENTMCNC: 29.1 G/DL (ref 31.4–35)
MCHC RBC AUTO-ENTMCNC: 29.7 G/DL (ref 31.4–35)
MCHC RBC AUTO-ENTMCNC: 30 G/DL (ref 31.4–35)
MCHC RBC AUTO-ENTMCNC: 30.3 G/DL (ref 31.4–35)
MCHC RBC AUTO-ENTMCNC: 30.9 G/DL (ref 31.4–35)
MCV RBC AUTO: 88 FL (ref 79.6–97.8)
MCV RBC AUTO: 90.5 FL (ref 79.6–97.8)
MCV RBC AUTO: 91.7 FL (ref 79.6–97.8)
MCV RBC AUTO: 92.9 FL (ref 79.6–97.8)
MCV RBC AUTO: 93.1 FL (ref 79.6–97.8)
MCV RBC AUTO: 96.3 FL (ref 79.6–97.8)
MONOCYTES # BLD: 0.3 K/UL (ref 0.1–1.3)
MONOCYTES # BLD: 0.4 K/UL (ref 0.1–1.3)
MONOCYTES # BLD: 0.6 K/UL (ref 0.1–1.3)
MONOCYTES # BLD: 0.6 K/UL (ref 0.1–1.3)
MONOCYTES # BLD: 0.8 K/UL (ref 0.1–1.3)
MONOCYTES # BLD: 1 K/UL (ref 0.1–1.3)
MONOCYTES NFR BLD: 10 % (ref 4–12)
MONOCYTES NFR BLD: 4 % (ref 4–12)
MONOCYTES NFR BLD: 6 % (ref 4–12)
MONOCYTES NFR BLD: 8 % (ref 4–12)
MUCOUS THREADS URNS QL MICRO: 0 /LPF
MUCOUS THREADS URNS QL MICRO: 0 /LPF
NEUTROPHILS NFR BRONCH MANUAL: 96 %
NEUTS SEG # BLD: 12 K/UL (ref 1.7–8.2)
NEUTS SEG # BLD: 5.5 K/UL (ref 1.7–8.2)
NEUTS SEG # BLD: 6.2 K/UL (ref 1.7–8.2)
NEUTS SEG # BLD: 7.9 K/UL (ref 1.7–8.2)
NEUTS SEG # BLD: 8.4 K/UL (ref 1.7–8.2)
NEUTS SEG # BLD: 9.1 K/UL (ref 1.7–8.2)
NEUTS SEG NFR BLD: 81 % (ref 43–78)
NEUTS SEG NFR BLD: 83 % (ref 43–78)
NEUTS SEG NFR BLD: 84 % (ref 43–78)
NEUTS SEG NFR BLD: 84 % (ref 43–78)
NEUTS SEG NFR BLD: 85 % (ref 43–78)
NEUTS SEG NFR BLD: 88 % (ref 43–78)
NITRITE UR QL STRIP.AUTO: NEGATIVE
NITRITE UR QL STRIP.AUTO: NEGATIVE
NON-GYNECOLOGIC CYTOLOGY REPRT: NORMAL
NRBC # BLD: 0 K/UL (ref 0–0.2)
NUC CELL # FLD: 5255 /CU MM
NUC CELL # FLD: <100 /CU MM
O2/TOTAL GAS SETTING VFR VENT: 40 %
OTHER OBSERVATIONS,UCOM: ABNORMAL
OTHER OBSERVATIONS,UCOM: ABNORMAL
P-R INTERVAL, ECG05: 180 MS
PCO2 BLD: 57.5 MMHG (ref 35–45)
PEEP RESPIRATORY: 8 CMH2O
PH BLD: 7.32 [PH] (ref 7.35–7.45)
PH UR STRIP: 5 [PH] (ref 5–9)
PH UR STRIP: 5 [PH] (ref 5–9)
PIP ISTAT,IPIP: 16
PLATELET # BLD AUTO: 142 K/UL (ref 150–450)
PLATELET # BLD AUTO: 149 K/UL (ref 150–450)
PLATELET # BLD AUTO: 149 K/UL (ref 150–450)
PLATELET # BLD AUTO: 193 K/UL (ref 150–450)
PLATELET # BLD AUTO: 235 K/UL (ref 150–450)
PLATELET # BLD AUTO: 245 K/UL (ref 150–450)
PMV BLD AUTO: 10.2 FL (ref 9.4–12.3)
PMV BLD AUTO: 10.4 FL (ref 9.4–12.3)
PMV BLD AUTO: 10.4 FL (ref 9.4–12.3)
PMV BLD AUTO: 9.6 FL (ref 9.4–12.3)
PMV BLD AUTO: 9.9 FL (ref 9.4–12.3)
PMV BLD AUTO: 9.9 FL (ref 9.4–12.3)
PO2 BLD: 127 MMHG (ref 75–100)
POTASSIUM SERPL-SCNC: 3.4 MMOL/L (ref 3.5–5.1)
POTASSIUM SERPL-SCNC: 3.6 MMOL/L (ref 3.5–5.1)
POTASSIUM SERPL-SCNC: 3.9 MMOL/L (ref 3.5–5.1)
POTASSIUM SERPL-SCNC: 3.9 MMOL/L (ref 3.5–5.1)
POTASSIUM SERPL-SCNC: 4.4 MMOL/L (ref 3.5–5.1)
POTASSIUM SERPL-SCNC: 4.7 MMOL/L (ref 3.5–5.1)
PROCALCITONIN SERPL-MCNC: 0.46 NG/ML
PROCALCITONIN SERPL-MCNC: 0.73 NG/ML
PROT FLD-MCNC: 2.3 G/DL
PROT FLD-MCNC: 2.8 G/DL
PROT SERPL-MCNC: 7 G/DL (ref 6.3–8.2)
PROT SERPL-MCNC: 7.5 G/DL (ref 6.3–8.2)
PROT SERPL-MCNC: 7.6 G/DL (ref 6.3–8.2)
PROT SERPL-MCNC: 7.9 G/DL (ref 6.3–8.2)
PROT UR STRIP-MCNC: 30 MG/DL
PROT UR STRIP-MCNC: NEGATIVE MG/DL
PROTHROMBIN TIME: 15 SEC (ref 12.5–14.7)
Q-T INTERVAL, ECG07: 328 MS
QRS DURATION, ECG06: 80 MS
QTC CALCULATION (BEZET), ECG08: 401 MS
RBC # BLD AUTO: 2.65 M/UL (ref 4.23–5.6)
RBC # BLD AUTO: 2.77 M/UL (ref 4.23–5.6)
RBC # BLD AUTO: 2.94 M/UL (ref 4.23–5.6)
RBC # BLD AUTO: 3.01 M/UL (ref 4.23–5.6)
RBC # BLD AUTO: 3.06 M/UL (ref 4.23–5.6)
RBC # BLD AUTO: 3.23 M/UL (ref 4.23–5.6)
RBC # FLD: 1000 /CU MM
RBC # FLD: 2000 /CU MM
RBC #/AREA URNS HPF: ABNORMAL /HPF
RBC #/AREA URNS HPF: ABNORMAL /HPF
SAO2 % BLD: 98.5 % (ref 95–98)
SERVICE CMNT-IMP: ABNORMAL
SERVICE CMNT-IMP: ABNORMAL
SERVICE CMNT-IMP: NORMAL
SODIUM SERPL-SCNC: 133 MMOL/L (ref 138–145)
SODIUM SERPL-SCNC: 141 MMOL/L (ref 138–145)
SODIUM SERPL-SCNC: 141 MMOL/L (ref 138–145)
SODIUM SERPL-SCNC: 142 MMOL/L (ref 136–145)
SODIUM SERPL-SCNC: 142 MMOL/L (ref 136–145)
SODIUM SERPL-SCNC: 143 MMOL/L (ref 136–145)
SOURCE, COVRS: NORMAL
SP GR UR REFRACTOMETRY: 1.01 (ref 1–1.02)
SP GR UR REFRACTOMETRY: 1.02 (ref 1–1.02)
SPECIMEN SOURCE FLD: NORMAL
SPECIMEN SOURCE: NORMAL
SPECIMEN TYPE: ABNORMAL
T4 FREE SERPL-MCNC: 1.3 NG/DL (ref 0.78–1.46)
TOTAL RESP. RATE, ITRR: 14
TRIGL FLD-MCNC: 10 MG/DL
TROPONIN-HIGH SENSITIVITY: 174.3 PG/ML (ref 0–14)
TROPONIN-HIGH SENSITIVITY: 192.6 PG/ML (ref 0–14)
TSH SERPL DL<=0.005 MIU/L-ACNC: 8.46 UIU/ML (ref 0.36–3.74)
UROBILINOGEN UR QL STRIP.AUTO: 0.2 EU/DL (ref 0.2–1)
UROBILINOGEN UR QL STRIP.AUTO: 0.2 EU/DL (ref 0.2–1)
VENTILATION MODE VENT: ABNORMAL
VENTRICULAR RATE, ECG03: 90 BPM
WBC # BLD AUTO: 10.9 K/UL (ref 4.3–11.1)
WBC # BLD AUTO: 14.1 K/UL (ref 4.3–11.1)
WBC # BLD AUTO: 6.6 K/UL (ref 4.3–11.1)
WBC # BLD AUTO: 7.4 K/UL (ref 4.3–11.1)
WBC # BLD AUTO: 9.5 K/UL (ref 4.3–11.1)
WBC # BLD AUTO: 9.8 K/UL (ref 4.3–11.1)
WBC URNS QL MICRO: >100 /HPF
WBC URNS QL MICRO: ABNORMAL /HPF

## 2021-01-01 PROCEDURE — 32557 INSERT CATH PLEURA W/ IMAGE: CPT

## 2021-01-01 PROCEDURE — 84484 ASSAY OF TROPONIN QUANT: CPT

## 2021-01-01 PROCEDURE — 99283 EMERGENCY DEPT VISIT LOW MDM: CPT

## 2021-01-01 PROCEDURE — 83615 LACTATE (LD) (LDH) ENZYME: CPT

## 2021-01-01 PROCEDURE — 87077 CULTURE AEROBIC IDENTIFY: CPT

## 2021-01-01 PROCEDURE — 76450000000

## 2021-01-01 PROCEDURE — P9047 ALBUMIN (HUMAN), 25%, 50ML: HCPCS | Performed by: INTERNAL MEDICINE

## 2021-01-01 PROCEDURE — 99222 1ST HOSP IP/OBS MODERATE 55: CPT | Performed by: INTERNAL MEDICINE

## 2021-01-01 PROCEDURE — 74011250637 HC RX REV CODE- 250/637: Performed by: INTERNAL MEDICINE

## 2021-01-01 PROCEDURE — 2709999900 HC NON-CHARGEABLE SUPPLY

## 2021-01-01 PROCEDURE — 96374 THER/PROPH/DIAG INJ IV PUSH: CPT

## 2021-01-01 PROCEDURE — 87116 MYCOBACTERIA CULTURE: CPT

## 2021-01-01 PROCEDURE — 84145 PROCALCITONIN (PCT): CPT

## 2021-01-01 PROCEDURE — 74011000250 HC RX REV CODE- 250: Performed by: INTERNAL MEDICINE

## 2021-01-01 PROCEDURE — 51798 US URINE CAPACITY MEASURE: CPT

## 2021-01-01 PROCEDURE — 77030010507 HC ADH SKN DERMBND J&J -B

## 2021-01-01 PROCEDURE — 97162 PT EVAL MOD COMPLEX 30 MIN: CPT

## 2021-01-01 PROCEDURE — 82664 ELECTROPHORETIC TEST: CPT

## 2021-01-01 PROCEDURE — 65270000029 HC RM PRIVATE

## 2021-01-01 PROCEDURE — 77030014146 HC TY THORCENT/PARACENT BD -B

## 2021-01-01 PROCEDURE — 83605 ASSAY OF LACTIC ACID: CPT

## 2021-01-01 PROCEDURE — 99232 SBSQ HOSP IP/OBS MODERATE 35: CPT | Performed by: INTERNAL MEDICINE

## 2021-01-01 PROCEDURE — 74011250636 HC RX REV CODE- 250/636: Performed by: INTERNAL MEDICINE

## 2021-01-01 PROCEDURE — 96365 THER/PROPH/DIAG IV INF INIT: CPT

## 2021-01-01 PROCEDURE — 74011250636 HC RX REV CODE- 250/636: Performed by: PHYSICIAN ASSISTANT

## 2021-01-01 PROCEDURE — 74011250636 HC RX REV CODE- 250/636: Performed by: EMERGENCY MEDICINE

## 2021-01-01 PROCEDURE — 97110 THERAPEUTIC EXERCISES: CPT

## 2021-01-01 PROCEDURE — 74011250636 HC RX REV CODE- 250/636: Performed by: STUDENT IN AN ORGANIZED HEALTH CARE EDUCATION/TRAINING PROGRAM

## 2021-01-01 PROCEDURE — 88305 TISSUE EXAM BY PATHOLOGIST: CPT

## 2021-01-01 PROCEDURE — 76870 US EXAM SCROTUM: CPT

## 2021-01-01 PROCEDURE — 88185 FLOWCYTOMETRY/TC ADD-ON: CPT

## 2021-01-01 PROCEDURE — 85025 COMPLETE CBC W/AUTO DIFF WBC: CPT

## 2021-01-01 PROCEDURE — 85610 PROTHROMBIN TIME: CPT

## 2021-01-01 PROCEDURE — 49083 ABD PARACENTESIS W/IMAGING: CPT

## 2021-01-01 PROCEDURE — G0156 HHCP-SVS OF AIDE,EA 15 MIN: HCPCS

## 2021-01-01 PROCEDURE — 3331090004 HSPC SERVICE INTENSITY ADD-ON

## 2021-01-01 PROCEDURE — 74011250637 HC RX REV CODE- 250/637: Performed by: STUDENT IN AN ORGANIZED HEALTH CARE EDUCATION/TRAINING PROGRAM

## 2021-01-01 PROCEDURE — 81001 URINALYSIS AUTO W/SCOPE: CPT

## 2021-01-01 PROCEDURE — 74011000258 HC RX REV CODE- 258: Performed by: INTERNAL MEDICINE

## 2021-01-01 PROCEDURE — 81015 MICROSCOPIC EXAM OF URINE: CPT

## 2021-01-01 PROCEDURE — 89050 BODY FLUID CELL COUNT: CPT

## 2021-01-01 PROCEDURE — 76705 ECHO EXAM OF ABDOMEN: CPT

## 2021-01-01 PROCEDURE — 36600 WITHDRAWAL OF ARTERIAL BLOOD: CPT

## 2021-01-01 PROCEDURE — 96367 TX/PROPH/DG ADDL SEQ IV INF: CPT

## 2021-01-01 PROCEDURE — 82945 GLUCOSE OTHER FLUID: CPT

## 2021-01-01 PROCEDURE — 51702 INSERT TEMP BLADDER CATH: CPT

## 2021-01-01 PROCEDURE — 99285 EMERGENCY DEPT VISIT HI MDM: CPT

## 2021-01-01 PROCEDURE — 87186 SC STD MICRODIL/AGAR DIL: CPT

## 2021-01-01 PROCEDURE — 87205 SMEAR GRAM STAIN: CPT

## 2021-01-01 PROCEDURE — 87635 SARS-COV-2 COVID-19 AMP PRB: CPT

## 2021-01-01 PROCEDURE — 36415 COLL VENOUS BLD VENIPUNCTURE: CPT

## 2021-01-01 PROCEDURE — 82042 OTHER SOURCE ALBUMIN QUAN EA: CPT

## 2021-01-01 PROCEDURE — 49083 ABD PARACENTESIS W/IMAGING: CPT | Performed by: INTERNAL MEDICINE

## 2021-01-01 PROCEDURE — 77030040831 HC BAG URINE DRNG MDII -A

## 2021-01-01 PROCEDURE — 83735 ASSAY OF MAGNESIUM: CPT

## 2021-01-01 PROCEDURE — 93005 ELECTROCARDIOGRAM TRACING: CPT | Performed by: EMERGENCY MEDICINE

## 2021-01-01 PROCEDURE — 82077 ASSAY SPEC XCP UR&BREATH IA: CPT

## 2021-01-01 PROCEDURE — 74011000258 HC RX REV CODE- 258: Performed by: EMERGENCY MEDICINE

## 2021-01-01 PROCEDURE — 84443 ASSAY THYROID STIM HORMONE: CPT

## 2021-01-01 PROCEDURE — 96375 TX/PRO/DX INJ NEW DRUG ADDON: CPT

## 2021-01-01 PROCEDURE — 84478 ASSAY OF TRIGLYCERIDES: CPT

## 2021-01-01 PROCEDURE — 74011000250 HC RX REV CODE- 250: Performed by: EMERGENCY MEDICINE

## 2021-01-01 PROCEDURE — 88184 FLOWCYTOMETRY/ TC 1 MARKER: CPT

## 2021-01-01 PROCEDURE — HOSPICE MEDICATION HC HH HOSPICE MEDICATION

## 2021-01-01 PROCEDURE — 84157 ASSAY OF PROTEIN OTHER: CPT

## 2021-01-01 PROCEDURE — 71045 X-RAY EXAM CHEST 1 VIEW: CPT

## 2021-01-01 PROCEDURE — G0155 HHCP-SVS OF CSW,EA 15 MIN: HCPCS

## 2021-01-01 PROCEDURE — 82803 BLOOD GASES ANY COMBINATION: CPT

## 2021-01-01 PROCEDURE — 74011250637 HC RX REV CODE- 250/637: Performed by: FAMILY MEDICINE

## 2021-01-01 PROCEDURE — 0W9G3ZZ DRAINAGE OF PERITONEAL CAVITY, PERCUTANEOUS APPROACH: ICD-10-PCS | Performed by: INTERNAL MEDICINE

## 2021-01-01 PROCEDURE — 80053 COMPREHEN METABOLIC PANEL: CPT

## 2021-01-01 PROCEDURE — 3336500001 HSPC ELECTION

## 2021-01-01 PROCEDURE — 87086 URINE CULTURE/COLONY COUNT: CPT

## 2021-01-01 PROCEDURE — 0651 HSPC ROUTINE HOME CARE

## 2021-01-01 PROCEDURE — 80048 BASIC METABOLIC PNL TOTAL CA: CPT

## 2021-01-01 PROCEDURE — 87040 BLOOD CULTURE FOR BACTERIA: CPT

## 2021-01-01 PROCEDURE — 74011250637 HC RX REV CODE- 250/637: Performed by: EMERGENCY MEDICINE

## 2021-01-01 PROCEDURE — 94660 CPAP INITIATION&MGMT: CPT

## 2021-01-01 PROCEDURE — 74011000258 HC RX REV CODE- 258: Performed by: STUDENT IN AN ORGANIZED HEALTH CARE EDUCATION/TRAINING PROGRAM

## 2021-01-01 PROCEDURE — G0299 HHS/HOSPICE OF RN EA 15 MIN: HCPCS

## 2021-01-01 PROCEDURE — 83880 ASSAY OF NATRIURETIC PEPTIDE: CPT

## 2021-01-01 PROCEDURE — 97166 OT EVAL MOD COMPLEX 45 MIN: CPT

## 2021-01-01 PROCEDURE — 84439 ASSAY OF FREE THYROXINE: CPT

## 2021-01-01 PROCEDURE — 74176 CT ABD & PELVIS W/O CONTRAST: CPT

## 2021-01-01 PROCEDURE — 49082 ABD PARACENTESIS: CPT

## 2021-01-01 PROCEDURE — 77030034850

## 2021-01-01 PROCEDURE — P9047 ALBUMIN (HUMAN), 25%, 50ML: HCPCS | Performed by: PHYSICIAN ASSISTANT

## 2021-01-01 PROCEDURE — 97112 NEUROMUSCULAR REEDUCATION: CPT

## 2021-01-01 PROCEDURE — 99223 1ST HOSP IP/OBS HIGH 75: CPT | Performed by: NURSE PRACTITIONER

## 2021-01-01 PROCEDURE — 94760 N-INVAS EAR/PLS OXIMETRY 1: CPT

## 2021-01-01 PROCEDURE — 97530 THERAPEUTIC ACTIVITIES: CPT

## 2021-01-01 PROCEDURE — 65610000001 HC ROOM ICU GENERAL

## 2021-01-01 PROCEDURE — 0W993ZZ DRAINAGE OF RIGHT PLEURAL CAVITY, PERCUTANEOUS APPROACH: ICD-10-PCS | Performed by: PHYSICIAN ASSISTANT

## 2021-01-01 PROCEDURE — 94762 N-INVAS EAR/PLS OXIMTRY CONT: CPT

## 2021-01-01 PROCEDURE — 88112 CYTOPATH CELL ENHANCE TECH: CPT

## 2021-01-01 PROCEDURE — 99223 1ST HOSP IP/OBS HIGH 75: CPT | Performed by: INTERNAL MEDICINE

## 2021-01-01 RX ORDER — SODIUM CHLORIDE 0.9 % (FLUSH) 0.9 %
5-40 SYRINGE (ML) INJECTION EVERY 8 HOURS
Status: DISCONTINUED | OUTPATIENT
Start: 2021-01-01 | End: 2021-01-01 | Stop reason: HOSPADM

## 2021-01-01 RX ORDER — ONDANSETRON 2 MG/ML
4 INJECTION INTRAMUSCULAR; INTRAVENOUS
Status: DISCONTINUED | OUTPATIENT
Start: 2021-01-01 | End: 2021-01-01 | Stop reason: HOSPADM

## 2021-01-01 RX ORDER — SULFAMETHOXAZOLE AND TRIMETHOPRIM 800; 160 MG/1; MG/1
1 TABLET ORAL 2 TIMES DAILY
Qty: 60 TABLET | Refills: 0 | Status: SHIPPED | OUTPATIENT
Start: 2021-01-01 | End: 2021-08-06

## 2021-01-01 RX ORDER — ALBUMIN HUMAN 250 G/1000ML
12.5-5 SOLUTION INTRAVENOUS
Status: DISCONTINUED | OUTPATIENT
Start: 2021-01-01 | End: 2021-01-01

## 2021-01-01 RX ORDER — ALBUMIN HUMAN 250 G/1000ML
12.5 SOLUTION INTRAVENOUS ONCE
Status: COMPLETED | OUTPATIENT
Start: 2021-01-01 | End: 2021-01-01

## 2021-01-01 RX ORDER — MAGNESIUM SULFATE HEPTAHYDRATE 40 MG/ML
2 INJECTION, SOLUTION INTRAVENOUS ONCE
Status: COMPLETED | OUTPATIENT
Start: 2021-01-01 | End: 2021-01-01

## 2021-01-01 RX ORDER — MORPHINE SULFATE 4 MG/ML
4 INJECTION INTRAVENOUS
Status: COMPLETED | OUTPATIENT
Start: 2021-01-01 | End: 2021-01-01

## 2021-01-01 RX ORDER — FUROSEMIDE 40 MG/1
40 TABLET ORAL DAILY
Qty: 30 TABLET | Refills: 0 | Status: SHIPPED | OUTPATIENT
Start: 2021-01-01 | End: 2021-08-06

## 2021-01-01 RX ORDER — TRAMADOL HYDROCHLORIDE 50 MG/1
50 TABLET ORAL
Status: DISCONTINUED | OUTPATIENT
Start: 2021-01-01 | End: 2021-01-01

## 2021-01-01 RX ORDER — LIDOCAINE HYDROCHLORIDE 20 MG/ML
JELLY TOPICAL ONCE
Status: COMPLETED | OUTPATIENT
Start: 2021-01-01 | End: 2021-01-01

## 2021-01-01 RX ORDER — NOREPINEPHRINE BITARTRATE/D5W 4MG/250ML
.5-16 PLASTIC BAG, INJECTION (ML) INTRAVENOUS
Status: DISCONTINUED | OUTPATIENT
Start: 2021-01-01 | End: 2021-01-01 | Stop reason: HOSPADM

## 2021-01-01 RX ORDER — ACETAMINOPHEN 325 MG/1
650 TABLET ORAL
Status: DISCONTINUED | OUTPATIENT
Start: 2021-01-01 | End: 2021-01-01 | Stop reason: HOSPADM

## 2021-01-01 RX ORDER — LORAZEPAM 2 MG/ML
1 CONCENTRATE ORAL
Qty: 10 ML | Refills: 0 | Status: SHIPPED | OUTPATIENT
Start: 2021-01-01 | End: 2021-07-22

## 2021-01-01 RX ORDER — FLUOXETINE HYDROCHLORIDE 20 MG/1
20 CAPSULE ORAL DAILY
Status: DISCONTINUED | OUTPATIENT
Start: 2021-01-01 | End: 2021-01-01 | Stop reason: HOSPADM

## 2021-01-01 RX ORDER — CIPROFLOXACIN 500 MG/1
500 TABLET ORAL 2 TIMES DAILY
Qty: 56 TAB | Refills: 0 | Status: SHIPPED | OUTPATIENT
Start: 2021-01-01 | End: 2021-01-01

## 2021-01-01 RX ORDER — HALOPERIDOL 2 MG/ML
2 SOLUTION ORAL
Qty: 30 ML | Refills: 0 | Status: SHIPPED | OUTPATIENT
Start: 2021-01-01 | End: 2021-07-22

## 2021-01-01 RX ORDER — OXYCODONE HYDROCHLORIDE 5 MG/1
5 TABLET ORAL
Status: DISCONTINUED | OUTPATIENT
Start: 2021-01-01 | End: 2021-01-01 | Stop reason: HOSPADM

## 2021-01-01 RX ORDER — SODIUM CHLORIDE 0.9 % (FLUSH) 0.9 %
5-40 SYRINGE (ML) INJECTION AS NEEDED
Status: DISCONTINUED | OUTPATIENT
Start: 2021-01-01 | End: 2021-01-01 | Stop reason: HOSPADM

## 2021-01-01 RX ORDER — FUROSEMIDE 10 MG/ML
80 INJECTION INTRAMUSCULAR; INTRAVENOUS 2 TIMES DAILY
Status: DISCONTINUED | OUTPATIENT
Start: 2021-01-01 | End: 2021-01-01

## 2021-01-01 RX ORDER — TAMSULOSIN HYDROCHLORIDE 0.4 MG/1
0.4 CAPSULE ORAL ONCE
Status: COMPLETED | OUTPATIENT
Start: 2021-01-01 | End: 2021-01-01

## 2021-01-01 RX ORDER — MORPHINE SULFATE 20 MG/ML
5 SOLUTION ORAL
Qty: 10 ML | Refills: 0 | Status: SHIPPED | OUTPATIENT
Start: 2021-01-01 | End: 2021-07-12

## 2021-01-01 RX ORDER — LANOLIN ALCOHOL/MO/W.PET/CERES
100 CREAM (GRAM) TOPICAL DAILY
Status: DISCONTINUED | OUTPATIENT
Start: 2021-01-01 | End: 2021-01-01 | Stop reason: HOSPADM

## 2021-01-01 RX ORDER — ALBUMIN HUMAN 250 G/1000ML
25 SOLUTION INTRAVENOUS EVERY 6 HOURS
Status: DISCONTINUED | OUTPATIENT
Start: 2021-01-01 | End: 2021-01-01

## 2021-01-01 RX ORDER — ONDANSETRON 2 MG/ML
8 INJECTION INTRAMUSCULAR; INTRAVENOUS
Status: COMPLETED | OUTPATIENT
Start: 2021-01-01 | End: 2021-01-01

## 2021-01-01 RX ORDER — HEPARIN SODIUM 5000 [USP'U]/ML
5000 INJECTION, SOLUTION INTRAVENOUS; SUBCUTANEOUS EVERY 8 HOURS
Status: DISCONTINUED | OUTPATIENT
Start: 2021-01-01 | End: 2021-01-01 | Stop reason: HOSPADM

## 2021-01-01 RX ORDER — CIPROFLOXACIN 500 MG/1
500 TABLET ORAL
Status: COMPLETED | OUTPATIENT
Start: 2021-01-01 | End: 2021-01-01

## 2021-01-01 RX ORDER — MIDODRINE HYDROCHLORIDE 5 MG/1
10 TABLET ORAL
Status: DISCONTINUED | OUTPATIENT
Start: 2021-01-01 | End: 2021-01-01 | Stop reason: HOSPADM

## 2021-01-01 RX ORDER — SODIUM CHLORIDE 0.9 % (FLUSH) 0.9 %
5-10 SYRINGE (ML) INJECTION EVERY 8 HOURS
Status: DISCONTINUED | OUTPATIENT
Start: 2021-01-01 | End: 2021-01-01 | Stop reason: SDUPTHER

## 2021-01-01 RX ORDER — TAMSULOSIN HYDROCHLORIDE 0.4 MG/1
0.4 CAPSULE ORAL DAILY
Status: DISCONTINUED | OUTPATIENT
Start: 2021-01-01 | End: 2021-01-01 | Stop reason: HOSPADM

## 2021-01-01 RX ORDER — ALBUMIN HUMAN 250 G/1000ML
50 SOLUTION INTRAVENOUS ONCE
Status: DISCONTINUED | OUTPATIENT
Start: 2021-01-01 | End: 2021-01-01

## 2021-01-01 RX ORDER — KETOROLAC TROMETHAMINE 30 MG/ML
15 INJECTION, SOLUTION INTRAMUSCULAR; INTRAVENOUS
Status: COMPLETED | OUTPATIENT
Start: 2021-01-01 | End: 2021-01-01

## 2021-01-01 RX ORDER — SODIUM CHLORIDE 0.9 % (FLUSH) 0.9 %
5-10 SYRINGE (ML) INJECTION AS NEEDED
Status: DISCONTINUED | OUTPATIENT
Start: 2021-01-01 | End: 2021-01-01 | Stop reason: SDUPTHER

## 2021-01-01 RX ORDER — LEVOTHYROXINE SODIUM 75 UG/1
75 TABLET ORAL
Status: DISCONTINUED | OUTPATIENT
Start: 2021-01-01 | End: 2021-01-01 | Stop reason: HOSPADM

## 2021-01-01 RX ORDER — HYDRALAZINE HYDROCHLORIDE 20 MG/ML
10 INJECTION INTRAMUSCULAR; INTRAVENOUS
Status: DISCONTINUED | OUTPATIENT
Start: 2021-01-01 | End: 2021-01-01 | Stop reason: HOSPADM

## 2021-01-01 RX ORDER — SENNOSIDES 8.6 MG/1
1 TABLET ORAL DAILY PRN
Status: DISCONTINUED | OUTPATIENT
Start: 2021-01-01 | End: 2021-01-01 | Stop reason: HOSPADM

## 2021-01-01 RX ORDER — FUROSEMIDE 10 MG/ML
80 INJECTION INTRAMUSCULAR; INTRAVENOUS
Status: COMPLETED | OUTPATIENT
Start: 2021-01-01 | End: 2021-01-01

## 2021-01-01 RX ORDER — TAMSULOSIN HYDROCHLORIDE 0.4 MG/1
0.4 CAPSULE ORAL DAILY
Qty: 30 CAP | Refills: 0 | Status: SHIPPED | OUTPATIENT
Start: 2021-01-01 | End: 2021-01-01

## 2021-01-01 RX ORDER — MIDODRINE HYDROCHLORIDE 10 MG/1
10 TABLET ORAL
Qty: 90 TABLET | Refills: 0 | Status: SHIPPED | OUTPATIENT
Start: 2021-01-01 | End: 2021-08-06

## 2021-01-01 RX ORDER — PANTOPRAZOLE SODIUM 40 MG/1
40 TABLET, DELAYED RELEASE ORAL
Status: DISCONTINUED | OUTPATIENT
Start: 2021-01-01 | End: 2021-01-01 | Stop reason: HOSPADM

## 2021-01-01 RX ORDER — KETOROLAC TROMETHAMINE 10 MG/1
10 TABLET, FILM COATED ORAL
Qty: 30 TAB | Refills: 0 | Status: SHIPPED | OUTPATIENT
Start: 2021-01-01 | End: 2021-01-01 | Stop reason: ALTCHOICE

## 2021-01-01 RX ADMIN — LEVOTHYROXINE SODIUM 75 MCG: 0.07 TABLET ORAL at 07:09

## 2021-01-01 RX ADMIN — OXYCODONE 5 MG: 5 TABLET ORAL at 12:37

## 2021-01-01 RX ADMIN — CIPROFLOXACIN 500 MG: 500 TABLET, FILM COATED ORAL at 06:18

## 2021-01-01 RX ADMIN — ALBUMIN (HUMAN) 25 G: 0.25 INJECTION, SOLUTION INTRAVENOUS at 05:23

## 2021-01-01 RX ADMIN — Medication 10 ML: at 00:33

## 2021-01-01 RX ADMIN — Medication 10 ML: at 22:00

## 2021-01-01 RX ADMIN — TRAMADOL HYDROCHLORIDE 50 MG: 50 TABLET, FILM COATED ORAL at 08:08

## 2021-01-01 RX ADMIN — ONDANSETRON 8 MG: 2 INJECTION INTRAMUSCULAR; INTRAVENOUS at 03:50

## 2021-01-01 RX ADMIN — ALBUMIN (HUMAN) 25 G: 0.25 INJECTION, SOLUTION INTRAVENOUS at 17:52

## 2021-01-01 RX ADMIN — ALBUMIN (HUMAN) 12.5 G: 0.25 INJECTION, SOLUTION INTRAVENOUS at 16:18

## 2021-01-01 RX ADMIN — Medication 10 ML: at 05:10

## 2021-01-01 RX ADMIN — CEFTRIAXONE 1 G: 1 INJECTION, POWDER, FOR SOLUTION INTRAMUSCULAR; INTRAVENOUS at 07:09

## 2021-01-01 RX ADMIN — Medication 10 ML: at 13:57

## 2021-01-01 RX ADMIN — TAMSULOSIN HYDROCHLORIDE 0.4 MG: 0.4 CAPSULE ORAL at 08:20

## 2021-01-01 RX ADMIN — ALBUMIN (HUMAN) 25 G: 0.25 INJECTION, SOLUTION INTRAVENOUS at 23:41

## 2021-01-01 RX ADMIN — CEFTRIAXONE 2 G: 2 INJECTION, POWDER, FOR SOLUTION INTRAMUSCULAR; INTRAVENOUS at 21:00

## 2021-01-01 RX ADMIN — FLUOXETINE 20 MG: 20 CAPSULE ORAL at 08:05

## 2021-01-01 RX ADMIN — BUMETANIDE 1 MG/HR: 0.25 INJECTION, SOLUTION INTRAMUSCULAR; INTRAVENOUS at 07:11

## 2021-01-01 RX ADMIN — ALBUMIN (HUMAN) 25 G: 0.25 INJECTION, SOLUTION INTRAVENOUS at 12:57

## 2021-01-01 RX ADMIN — Medication 100 MG: at 21:00

## 2021-01-01 RX ADMIN — FLUOXETINE 20 MG: 20 CAPSULE ORAL at 08:33

## 2021-01-01 RX ADMIN — PANTOPRAZOLE SODIUM 40 MG: 40 TABLET, DELAYED RELEASE ORAL at 07:09

## 2021-01-01 RX ADMIN — TAMSULOSIN HYDROCHLORIDE 0.4 MG: 0.4 CAPSULE ORAL at 08:33

## 2021-01-01 RX ADMIN — ALBUMIN (HUMAN) 25 G: 0.25 INJECTION, SOLUTION INTRAVENOUS at 08:12

## 2021-01-01 RX ADMIN — BUMETANIDE 1 MG/HR: 0.25 INJECTION, SOLUTION INTRAMUSCULAR; INTRAVENOUS at 10:38

## 2021-01-01 RX ADMIN — PANTOPRAZOLE SODIUM 40 MG: 40 TABLET, DELAYED RELEASE ORAL at 08:20

## 2021-01-01 RX ADMIN — LEVOTHYROXINE SODIUM 75 MCG: 0.07 TABLET ORAL at 08:05

## 2021-01-01 RX ADMIN — ALBUMIN (HUMAN) 25 G: 0.25 INJECTION, SOLUTION INTRAVENOUS at 12:08

## 2021-01-01 RX ADMIN — LIDOCAINE HYDROCHLORIDE: 20 JELLY TOPICAL at 12:15

## 2021-01-01 RX ADMIN — Medication 10 ML: at 21:23

## 2021-01-01 RX ADMIN — TAMSULOSIN HYDROCHLORIDE 0.4 MG: 0.4 CAPSULE ORAL at 08:05

## 2021-01-01 RX ADMIN — FLUOXETINE 20 MG: 20 CAPSULE ORAL at 08:20

## 2021-01-01 RX ADMIN — Medication 100 MG: at 21:26

## 2021-01-01 RX ADMIN — MIDODRINE HYDROCHLORIDE 10 MG: 5 TABLET ORAL at 16:30

## 2021-01-01 RX ADMIN — KETOROLAC TROMETHAMINE 15 MG: 30 INJECTION, SOLUTION INTRAMUSCULAR at 03:50

## 2021-01-01 RX ADMIN — AZITHROMYCIN MONOHYDRATE 500 MG: 500 INJECTION, POWDER, LYOPHILIZED, FOR SOLUTION INTRAVENOUS at 17:52

## 2021-01-01 RX ADMIN — OXYCODONE 5 MG: 5 TABLET ORAL at 18:00

## 2021-01-01 RX ADMIN — ALBUMIN (HUMAN) 25 G: 0.25 INJECTION, SOLUTION INTRAVENOUS at 17:17

## 2021-01-01 RX ADMIN — PANTOPRAZOLE SODIUM 40 MG: 40 TABLET, DELAYED RELEASE ORAL at 08:05

## 2021-01-01 RX ADMIN — MIDODRINE HYDROCHLORIDE 10 MG: 5 TABLET ORAL at 12:57

## 2021-01-01 RX ADMIN — MIDODRINE HYDROCHLORIDE 10 MG: 5 TABLET ORAL at 08:05

## 2021-01-01 RX ADMIN — ALBUMIN (HUMAN) 12.5 G: 0.25 INJECTION, SOLUTION INTRAVENOUS at 09:09

## 2021-01-01 RX ADMIN — AZITHROMYCIN MONOHYDRATE 500 MG: 500 INJECTION, POWDER, LYOPHILIZED, FOR SOLUTION INTRAVENOUS at 22:08

## 2021-01-01 RX ADMIN — MAGNESIUM SULFATE HEPTAHYDRATE 2 G: 40 INJECTION, SOLUTION INTRAVENOUS at 21:29

## 2021-01-01 RX ADMIN — Medication 10 ML: at 15:22

## 2021-01-01 RX ADMIN — FUROSEMIDE 80 MG: 10 INJECTION, SOLUTION INTRAMUSCULAR; INTRAVENOUS at 22:17

## 2021-01-01 RX ADMIN — BUMETANIDE 1 MG/HR: 0.25 INJECTION, SOLUTION INTRAMUSCULAR; INTRAVENOUS at 00:30

## 2021-01-01 RX ADMIN — NOREPINEPHRINE BITARTRATE 4 MCG/MIN: 1 INJECTION, SOLUTION, CONCENTRATE INTRAVENOUS at 23:12

## 2021-01-01 RX ADMIN — MIDODRINE HYDROCHLORIDE 10 MG: 5 TABLET ORAL at 08:19

## 2021-01-01 RX ADMIN — AZITHROMYCIN MONOHYDRATE 500 MG: 500 INJECTION, POWDER, LYOPHILIZED, FOR SOLUTION INTRAVENOUS at 17:22

## 2021-01-01 RX ADMIN — Medication 10 ML: at 05:11

## 2021-01-01 RX ADMIN — TRAMADOL HYDROCHLORIDE 50 MG: 50 TABLET, FILM COATED ORAL at 16:30

## 2021-01-01 RX ADMIN — MIDODRINE HYDROCHLORIDE 10 MG: 5 TABLET ORAL at 12:34

## 2021-01-01 RX ADMIN — TAMSULOSIN HYDROCHLORIDE 0.4 MG: 0.4 CAPSULE ORAL at 05:25

## 2021-01-01 RX ADMIN — ACETAMINOPHEN 650 MG: 325 TABLET ORAL at 12:57

## 2021-01-01 RX ADMIN — BUMETANIDE 1 MG/HR: 0.25 INJECTION, SOLUTION INTRAMUSCULAR; INTRAVENOUS at 17:30

## 2021-01-01 RX ADMIN — ALBUMIN (HUMAN) 12.5 G: 0.25 INJECTION, SOLUTION INTRAVENOUS at 09:16

## 2021-01-01 RX ADMIN — CEFTRIAXONE 2 G: 2 INJECTION, POWDER, FOR SOLUTION INTRAMUSCULAR; INTRAVENOUS at 08:19

## 2021-01-01 RX ADMIN — CEFTRIAXONE 1 G: 1 INJECTION, POWDER, FOR SOLUTION INTRAMUSCULAR; INTRAVENOUS at 08:05

## 2021-01-01 RX ADMIN — LIDOCAINE HYDROCHLORIDE: 20 JELLY TOPICAL at 06:06

## 2021-01-01 RX ADMIN — LEVOTHYROXINE SODIUM 75 MCG: 0.07 TABLET ORAL at 08:20

## 2021-01-01 RX ADMIN — Medication 10 ML: at 05:24

## 2021-01-01 RX ADMIN — BUMETANIDE 1 MG/HR: 0.25 INJECTION, SOLUTION INTRAMUSCULAR; INTRAVENOUS at 06:36

## 2021-01-01 RX ADMIN — Medication 10 ML: at 14:35

## 2021-01-01 RX ADMIN — MORPHINE SULFATE 4 MG: 4 INJECTION INTRAVENOUS at 03:50

## 2021-01-06 PROBLEM — E66.01 SEVERE OBESITY (HCC): Status: RESOLVED | Noted: 2019-08-16 | Resolved: 2021-01-01

## 2021-01-08 NOTE — DISCHARGE INSTRUCTIONS
Set up appointment with urology. Return to the emergency department for any concerns. Take antibiotics to completion.

## 2021-01-08 NOTE — ED NOTES
I have reviewed discharge instructions with the patient. The patient verbalized understanding. Patient left ED via Discharge Method: ambulatory to Home with wife Opportunity for questions and clarification provided. Patient given 2 scripts. To continue your aftercare when you leave the hospital, you may receive an automated call from our care team to check in on how you are doing. This is a free service and part of our promise to provide the best care and service to meet your aftercare needs.  If you have questions, or wish to unsubscribe from this service please call [phone]. Thank you for Choosing our [facility] Emergency Department.

## 2021-01-08 NOTE — ED PROVIDER NOTES
Chapito Jaramillo is a 79 y.o. male seen on 1/8/2021 in the [facility] EMERGENCY DEPT in room ERA/A. Chief Complaint Patient presents with  Urinary Retention HPI: 68-year-old  male with history of alcoholic cirrhosis presented to the emergency department with lower abdominal pain, testicular pain and difficulty urinating has been ongoing for the past couple of days. Patient does have known history of cirrhosis and has not had or needed a paracentesis since June. Patient does have complain of mild abdominal distention. Patient states that he also has a history of kidney stones but has not had one since 1987. Patient states that he is unsure if this is how the kidney stone felt wound he last had it. Patient denies any chest pain, shortness of breath, fever, chills, lower extremity swelling. Patient states that he feels like if he presses down on his abdomen he is able to empty his bladder but he is having a very difficult time completely emptying his bladder. Patient is complaining of mainly testicle pain and pain underneath his testicles. Also has a known umbilical hernia that is easily reducible and not giving him problems. Historian: Patient REVIEW OF SYSTEMS Review of Systems Constitutional: Negative. HENT: Negative. Respiratory: Negative. Cardiovascular: Negative. Gastrointestinal: Positive for abdominal distention, abdominal pain, diarrhea and nausea. Negative for vomiting. Genitourinary: Positive for decreased urine volume, difficulty urinating, frequency, testicular pain and urgency. Negative for discharge, flank pain, hematuria, penile pain, penile swelling and scrotal swelling. Musculoskeletal: Negative. Skin: Negative. Neurological: Negative. Hematological: Negative. Psychiatric/Behavioral: Negative. All other systems reviewed and are negative. PAST MEDICAL HISTORY Past Medical History:  
Diagnosis Date  Abnormal echocardiogram 45/00/1029  
 systolic function 20-91% and Mod-severe AS  Aortic heart murmur 2019 Per pt  told by Dr. Reginald Godinez  ASCVD (arteriosclerotic cardiovascular disease)   
 calcification on CT scan  BCC (basal cell carcinoma of skin)  Diverticulosis 04/17/2017 CT scan- fatty liver, diverticulosis, umbilical hernia, prostate enlargement, normal appendix  Elevated liver enzymes   
 history of hepatic steatosis  Elevated PSA, less than 10 ng/ml 2019  GERD (gastroesophageal reflux disease) 06/26/2014 Managed with meds  Hypertension 06/26/2014 Managed with meds  Impotence of organic origin  Liver disease   
 ascites, per PCP office note dated 7/18/19- Fluid retention is impressive.  Macrocytic anemia  Mixed hyperlipidemia 6/26/2014  Nephrolithiasis  Obesity 5/8/2015  Osteoarthrosis 6/27/2014  Squamous cell carcinoma of skin of chest 2019  Unspecified hypothyroidism 6/26/2014 Past Surgical History:  
Procedure Laterality Date  IR PARACENTESIS ABD W IMAGE  12/2/2019  IR PARACENTESIS ABD W IMAGE  12/13/2019  IR PARACENTESIS ABD W IMAGE  1/31/2020  IR PARACENTESIS ABD W IMAGE  3/13/2020  IR PARACENTESIS ABD W IMAGE  4/17/2020  IR PARACENTESIS ABD W IMAGE  6/18/2020 Social History Socioeconomic History  Marital status:  Spouse name: Not on file  Number of children: Not on file  Years of education: Not on file  Highest education level: Not on file Tobacco Use  Smoking status: Never Smoker  Smokeless tobacco: Never Used Substance and Sexual Activity  Alcohol use: Yes Alcohol/week: 14.0 standard drinks Types: 14 Glasses of wine per week  Drug use: No  
 
Prior to Admission Medications Prescriptions Last Dose Informant Patient Reported? Taking? FLUoxetine (PROzac) 20 mg capsule   No No  
Sig: Take 1 Cap by mouth daily. clotrimazole-betamethasone (LOTRISONE) topical cream   No No  
Sig: Apply  to affected area two (2) times a day. Patient taking differently: Apply  to affected area two (2) times daily as needed. furosemide (Lasix) 80 mg tablet   No No  
Sig: Take 1 and 1/2 tablets daily. Patient taking differently: 80 mg daily. Take 1 tablet daily. levothyroxine (SYNTHROID) 75 mcg tablet   No No  
Sig: Take 1 Tab by mouth Daily (before breakfast). losartan (COZAAR) 50 mg tablet   No No  
Sig: Take 1 Tab by mouth daily. omeprazole (PRILOSEC) 20 mg capsule   No No  
Sig: Take 1 Cap by mouth daily. spironolactone (ALDACTONE) 50 mg tablet   No No  
Sig: Take 1 Tab by mouth daily. Patient taking differently: Take 25 mg by mouth daily. Take 1 tablet daily. tadalafil (CIALIS) 20 mg tablet   No No  
Sig: Take 1 Tab by mouth as needed. tamsulosin (Flomax) 0.4 mg capsule   No No  
Sig: Take 1 Cap by mouth daily. Indications: enlarged prostate with urination problem, stones in the urinary tract Facility-Administered Medications: None No Known Allergies PHYSICAL EXAM    
 
Vitals:  
 01/08/21 0254 BP: (!) 105/59 Pulse: (!) 52 Resp: 18 Temp: 97.7 °F (36.5 °C) SpO2: 100% Vital signs were reviewed. Physical Exam 
Vitals signs and nursing note reviewed. Constitutional:   
   Appearance: Normal appearance. HENT:  
   Head: Normocephalic and atraumatic. Mouth/Throat:  
   Mouth: Mucous membranes are moist.  
Eyes:  
   Extraocular Movements: Extraocular movements intact. Neck: Musculoskeletal: Normal range of motion. Cardiovascular:  
   Rate and Rhythm: Normal rate and regular rhythm. Pulses: Normal pulses. Heart sounds: Normal heart sounds. Pulmonary:  
   Effort: Pulmonary effort is normal.  
   Breath sounds: Normal breath sounds. Abdominal:  
   General: There is distension (Mild). Palpations: Abdomen is soft. Tenderness: There is no abdominal tenderness. There is no guarding or rebound. Hernia: A hernia (Large reducible umbilical hernia) is present. Genitourinary: 
   Comments: No swelling in the scrotum. Bilateral cremasteric reflexes are intact. Mild tenderness to epididymis bilaterally. Musculoskeletal: Normal range of motion. Right lower leg: No edema. Left lower leg: No edema. Skin: 
   General: Skin is warm and dry. Neurological:  
   General: No focal deficit present. Mental Status: He is alert and oriented to person, place, and time. Psychiatric:     
   Mood and Affect: Mood normal.     
   Behavior: Behavior normal.     
   Thought Content: Thought content normal.     
   Judgment: Judgment normal.  
 
  
 
MEDICAL DECISION MAKING  
 
ED Course:   
Recent Results (from the past 8 hour(s)) CBC WITH AUTOMATED DIFF Collection Time: 01/08/21  2:57 AM  
Result Value Ref Range WBC 14.1 (H) 4.3 - 11.1 K/uL  
 RBC 3.01 (L) 4.23 - 5.6 M/uL HGB 8.2 (L) 13.6 - 17.2 g/dL HCT 26.5 (L) 41.1 - 50.3 % MCV 88.0 79.6 - 97.8 FL  
 MCH 27.2 26.1 - 32.9 PG  
 MCHC 30.9 (L) 31.4 - 35.0 g/dL  
 RDW 16.2 (H) 11.9 - 14.6 % PLATELET 338 270 - 128 K/uL MPV 9.9 9.4 - 12.3 FL ABSOLUTE NRBC 0.00 0.0 - 0.2 K/uL  
 DF AUTOMATED NEUTROPHILS 85 (H) 43 - 78 % LYMPHOCYTES 7 (L) 13 - 44 % MONOCYTES 6 4.0 - 12.0 % EOSINOPHILS 0 (L) 0.5 - 7.8 % BASOPHILS 0 0.0 - 2.0 % IMMATURE GRANULOCYTES 1 0.0 - 5.0 %  
 ABS. NEUTROPHILS 12.0 (H) 1.7 - 8.2 K/UL  
 ABS. LYMPHOCYTES 1.0 0.5 - 4.6 K/UL  
 ABS. MONOCYTES 0.8 0.1 - 1.3 K/UL  
 ABS. EOSINOPHILS 0.1 0.0 - 0.8 K/UL  
 ABS. BASOPHILS 0.0 0.0 - 0.2 K/UL  
 ABS. IMM. GRANS. 0.2 0.0 - 0.5 K/UL METABOLIC PANEL, COMPREHENSIVE Collection Time: 01/08/21  2:57 AM  
Result Value Ref Range Sodium 133 (L) 138 - 145 mmol/L Potassium 4.7 3.5 - 5.1 mmol/L  Chloride 106 98 - 107 mmol/L  
 CO2 16 (L) 21 - 32 mmol/L  
 Anion gap 11 7 - 16 mmol/L Glucose 85 65 - 100 mg/dL BUN 45 (H) 8 - 23 MG/DL Creatinine 1.59 (H) 0.8 - 1.5 MG/DL  
 GFR est AA 56 (L) >60 ml/min/1.73m2 GFR est non-AA 46 (L) >60 ml/min/1.73m2 Calcium 9.5 8.3 - 10.4 MG/DL Bilirubin, total 0.7 0.2 - 1.1 MG/DL  
 ALT (SGPT) 18 12 - 65 U/L  
 AST (SGOT) 24 15 - 37 U/L Alk. phosphatase 278 (H) 50 - 136 U/L Protein, total 7.9 6.3 - 8.2 g/dL Albumin 3.2 3.2 - 4.6 g/dL Globulin 4.7 (H) 2.3 - 3.5 g/dL A-G Ratio 0.7 (L) 1.2 - 3.5 ETHYL ALCOHOL Collection Time: 01/08/21  2:57 AM  
Result Value Ref Range ALCOHOL(ETHYL),SERUM <3 MG/DL  
LACTIC ACID Collection Time: 01/08/21  4:22 AM  
Result Value Ref Range Lactic acid 0.8 0.4 - 2.0 MMOL/L  
 
Us Scrotum/testicles Result Date: 1/8/2021 Scrotal ultrasound COMPARISON: none INDICATION: Testicular pain TECHNIQUE: Gray-scale, color Doppler, and spectral waveform tracings were obtained of the scrotum. FINDINGS: Both testicles are normal in echogenicity, contour and size and demonstrate symmetric color Doppler flow. The right measures 3.2 x 2.5 x 2.6 cm and the left measures 2.9 x 2.4 x 3.1 cm. There is a 6 mm left tunica cyst. The epididymides appear within normal limits. There is trace left hydrocele. There is no evidence of varicocele. IMPRESSION: No evidence of testicular torsion or epididymoorchitis. Ct Urogram Wo Cont Result Date: 1/8/2021 CT abdomen and pelvis without contrast COMPARISON: none. INDICATION: Lower abdominal pain, radiating to the testicles. . TECHNIQUE: CT imaging through the abdomen and pelvis was performed without intravenous. Radiation dose reduction techniques were used for this study:  Our CT scanners use one or all of the following: Automated exposure control, adjustment of the mA and/or kVp according to patient's size, iterative reconstruction. FINDINGS: Visualized lung bases are unremarkable. There is trace pericardial effusion. Abdomen findings: Absence of IV contrast limits sensitivity. There is no renal calculus or hydronephrosis. There is liver cirrhosis. Spleen is enlarged measuring up to 14 cm. There is recanalization of the umbilical vein. There is large ascites. There is no free air. Findings consistent with portal hypertension. There is no radiopaque gallstone. Common bile duct is dilated measuring 11 mm in diameter. The pancreas is not well evaluated. Unenhanced adrenal glands are unremarkable. Abdominal aorta is normal in course and caliber with prominent atherosclerotic calcifications. Stomach is normal in contour. Small bowel loops are normal in caliber. No small bowel obstruction. No evidence of lymphadenopathy. Pelvic findings: Urinary bladder is distended. Prostate gland is enlarged and heterogeneous. The seminal vesicles are prominent. There is calcification of the vas deference, suggesting underlying diabetes There is sigmoid/descending colon diverticulosis without diverticulitis. Colon is otherwise normal in course and caliber. No evidence of appendicitis. There are degenerative changes of the spine. IMPRESSION: 1. Large ascites. Cirrhotic liver and findings of portal hypertension (including splenomegaly). 2. Enlarged/heterogeneous prostate gland and seminal vesicles, with appearance of cystic changes. Adjacent fat stranding. Consider possibility of prostatitis. No drainable fluid collection. 3. Dilated common bile duct (up to 11 mm in diameter). No radiopaque gallstone. 4.  Negative for renal calculus or hydronephrosis. 5. Sigmoid/descending colon diverticulosis. Negative for diverticulitis, colitis or bowel obstruction. 6. Absence of IV contrast limits sensitivity. 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 Patient labs were reassuring. Patient underwent CT of his abdomen pelvis as well as an ultrasound of the scrotum and testicles. Patient's CT revealed large volume ascites as well as thickened bladder wall with an enlarged prostate. Ultrasound of the scrotum and testicles were unremarkable. Patient pain is consistent with prostatitis. Patient had Neville catheter placed for his urinary retention. Patient given pain control in the emergency department. Patient will be discharged home with 4 weeks of Cipro. He will be referred to urology for follow-up for his urinary retention. Patient will return emergency department for any concerns. Amount and/or Complexity of Data Reviewed Clinical lab tests: ordered and reviewed Tests in the radiology section of CPT®: ordered and reviewed Decide to obtain previous medical records or to obtain history from someone other than the patient: yes Review and summarize past medical records: yes Independent visualization of images, tracings, or specimens: yes Patient Progress Patient progress: improved Disposition:  Discharged Diagnosis: ICD-10-CM ICD-9-CM 1. Urinary retention  R33.9 788.20  
2. Acute prostatitis  N41.0 601.0 3. 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  
 
____________________________________________________________________ A portion of this note was generated using voice recognition dictation software. While the note has been reviewed for accuracy, please note certain words and phrases may not be transcribed as intended and some grammatical and/or typographical errors may be present.

## 2021-01-08 NOTE — ED TRIAGE NOTES
Arrives with face mask in place. Reports abdominal pain radiating down into scrotum. Reports urinary retention. Large umbilical hernia noted. Reports hx cirrhosis r/t ETOH. Reports wine last 2 days. Denies n/v/d, fever/chills.

## 2021-01-11 NOTE — DISCHARGE INSTRUCTIONS
Continue your medication as initially prescribed. Follow-up with urology, call today to make the appointment. Return to the ER for worsening or worrisome symptoms.

## 2021-01-11 NOTE — ED PROVIDER NOTES
Patient is a 24-year-old male presents to [facility] emergency department with Neville catheter in place. Patient reports decreased urine output over the weekend, increase abdominal pain and bladder distention since the catheter was placed in the emergency department last week. Patient denies fever, chills, nausea, vomiting. He endorses compliance with his antibiotics which were prescribed last week. Denies hematuria. Denies significant dysuria. Upon my evaluation, Neville catheter is already been replaced. Patient denies any pain currently. Reports abdominal pain initially but now feels much improved. No complaints currently. Past Medical History:  
Diagnosis Date  Abnormal echocardiogram 11/10/8605  
 systolic function 61-03% and Mod-severe AS  Aortic heart murmur 2019 Per pt  told by Dr. Dell Singh  ASCVD (arteriosclerotic cardiovascular disease)   
 calcification on CT scan  BCC (basal cell carcinoma of skin)  Diverticulosis 04/17/2017 CT scan- fatty liver, diverticulosis, umbilical hernia, prostate enlargement, normal appendix  Elevated liver enzymes   
 history of hepatic steatosis  Elevated PSA, less than 10 ng/ml 2019  GERD (gastroesophageal reflux disease) 06/26/2014 Managed with meds  Hypertension 06/26/2014 Managed with meds  Impotence of organic origin  Liver disease   
 ascites, per PCP office note dated 7/18/19- Fluid retention is impressive.  Macrocytic anemia  Mixed hyperlipidemia 6/26/2014  Nephrolithiasis  Obesity 5/8/2015  Osteoarthrosis 6/27/2014  Squamous cell carcinoma of skin of chest 2019  Unspecified hypothyroidism 6/26/2014 Past Surgical History:  
Procedure Laterality Date  IR PARACENTESIS ABD W IMAGE  12/2/2019  IR PARACENTESIS ABD W IMAGE  12/13/2019  IR PARACENTESIS ABD W IMAGE  1/31/2020  IR PARACENTESIS ABD W IMAGE  3/13/2020  IR PARACENTESIS ABD W IMAGE  4/17/2020  IR PARACENTESIS ABD W IMAGE  6/18/2020 Family History:  
Problem Relation Age of Onset  Cancer Father   
     pancreatic Social History Socioeconomic History  Marital status:  Spouse name: Not on file  Number of children: Not on file  Years of education: Not on file  Highest education level: Not on file Occupational History  Not on file Social Needs  Financial resource strain: Not on file  Food insecurity Worry: Not on file Inability: Not on file  Transportation needs Medical: Not on file Non-medical: Not on file Tobacco Use  Smoking status: Never Smoker  Smokeless tobacco: Never Used Substance and Sexual Activity  Alcohol use: Yes Alcohol/week: 14.0 standard drinks Types: 14 Glasses of wine per week  Drug use: No  
 Sexual activity: Not on file Lifestyle  Physical activity Days per week: Not on file Minutes per session: Not on file  Stress: Not on file Relationships  Social connections Talks on phone: Not on file Gets together: Not on file Attends Taoism service: Not on file Active member of club or organization: Not on file Attends meetings of clubs or organizations: Not on file Relationship status: Not on file  Intimate partner violence Fear of current or ex partner: Not on file Emotionally abused: Not on file Physically abused: Not on file Forced sexual activity: Not on file Other Topics Concern  Not on file Social History Narrative  Not on file ALLERGIES: Patient has no known allergies. Review of Systems Constitutional: Negative for chills and fever. HENT: Negative for congestion, rhinorrhea, sinus pressure and sore throat. Eyes: Negative for visual disturbance. Respiratory: Negative for cough and shortness of breath. Cardiovascular: Negative for chest pain. Gastrointestinal: Positive for abdominal distention and abdominal pain. Negative for blood in stool, diarrhea, nausea and vomiting. Endocrine: Negative for polyuria. Genitourinary: Positive for difficulty urinating. Negative for dysuria. Musculoskeletal: Negative for arthralgias, neck pain and neck stiffness. Skin: Negative for color change and rash. Neurological: Negative for syncope, speech difficulty, weakness, numbness and headaches. Psychiatric/Behavioral: Negative for behavioral problems, confusion and suicidal ideas. All other systems reviewed and are negative. Vitals:  
 01/11/21 0710 BP: 100/67 Pulse: (!) 113 Resp: 16 Temp: 98.1 °F (36.7 °C) SpO2: 96% Weight: 79.4 kg (175 lb) Height: 5' 11\" (1.803 m) Physical Exam 
Vitals signs and nursing note reviewed. Constitutional:   
   Appearance: Normal appearance. HENT:  
   Head: Normocephalic and atraumatic. Nose: Nose normal.  
   Mouth/Throat:  
   Mouth: Mucous membranes are moist.  
Eyes:  
   Extraocular Movements: Extraocular movements intact. Neck: Musculoskeletal: Normal range of motion. Cardiovascular:  
   Rate and Rhythm: Normal rate and regular rhythm. Heart sounds: Normal heart sounds. Pulmonary:  
   Effort: Pulmonary effort is normal.  
   Breath sounds: Normal breath sounds. No wheezing, rhonchi or rales. Abdominal:  
   General: Abdomen is flat. Palpations: Abdomen is soft. Tenderness: There is no abdominal tenderness. There is no guarding. Musculoskeletal: Normal range of motion. Skin: 
   General: Skin is warm and dry. Neurological:  
   General: No focal deficit present. Mental Status: He is alert and oriented to person, place, and time. Psychiatric:     
   Mood and Affect: Mood normal.  
 
  
 
MDM Number of Diagnoses or Management Options Urinary catheter change required Diagnosis management comments: Patient arrives with complaints of Neville catheter obstruction, this was exchanged with good urine output. Urinalysis shows positive leuk esterase, greater than 100 WBC, 3+ bacteria present. Labs show systemic normal WBC, stable H&H, baseline kidney dysfunction, GFR 34. Patient states has been taking his ciprofloxacin as previously prescribed. Advised to continue doing this. Patient has appointment with urology on Friday, encouraged to keep this appointment, possibly see sooner if able. Patient will return to the ER for worsening or worrisome symptoms. He voiced understanding and agreement with this plan. Procedures

## 2021-01-11 NOTE — ED NOTES
I have reviewed discharge instructions with the patient. The patient verbalized understanding. Patient left ED via Discharge Method: ambulatory to Home with self. Opportunity for questions and clarification provided. Patient given 0 scripts. To continue your aftercare when you leave the hospital, you may receive an automated call from our care team to check in on how you are doing. This is a free service and part of our promise to provide the best care and service to meet your aftercare needs.  If you have questions, or wish to unsubscribe from this service please call [phone]. Thank you for Choosing our [facility] Emergency Department.

## 2021-01-11 NOTE — ED TRIAGE NOTES
Patient ambulatory to triage with mask in place. Patient reports he had a catheter placed Thursday night and he has had no output for 24 hours. Pt reports he had \"one big shot of pussy stuff\" yesterday morning.

## 2021-01-11 NOTE — ED NOTES
Pt arrived with Neville in place, not draining, 622mL on bladder scan. Neville discontinued and new [place] tip, 16 Fr replaced. Urine draining well, tan, puss filled drainage. Dr. Jadon Alejandro in attendance.

## 2021-03-23 PROBLEM — M25.50 ARTHRALGIA: Status: ACTIVE | Noted: 2021-01-01

## 2021-03-23 PROBLEM — G89.4 CHRONIC PAIN SYNDROME: Status: ACTIVE | Noted: 2021-01-01

## 2021-03-26 NOTE — PROGRESS NOTES
Interventional Radiology Post Paracentesis/Thoracentesis Note    3/26/2021    Procedure(s): Ultrasound guided Therapeutic Paracentesis Performed with 8 Ethiopian catheter total volume 9300 ml. Samples sent to lab. Preliminary Findings: large yellow. Complications: None    Plan:  Observation, check labs if drawn.           Chest X-Ray:  no    Full dictated report to follow

## 2021-03-26 NOTE — PROGRESS NOTES
Noted a-fib on monitor which pt. States he does not have a hx. Of. 9,100 ml retrieved from para. Pt. Short of breath upon getting out of bed . States normal for him and sat in w/c for 20min. Refused to be seen by ER. Encouraged to get checked out again and pt. Insisted this always happens and it will go away.  Pt. Agreed to call 911 and/or his doctor about symptoms today

## 2021-03-26 NOTE — DISCHARGE INSTRUCTIONS
[address] [facility]  Department of Interventional Radiology  [facility]  [phone] Office  [phone] Fax    PARACENTESIS DISCHARGE INSTRUCTIONS    General Information:  During this procedure, the doctor will insert a needle into the abdomen to drain fluid. After the procedure, you will be able to take a deep breath much easier. The site of the puncture may ooze the first day. This will decrease and eventually stop. Paracentesis (draining fluid from the abdomen) sometimes makes patients hypotensive (low blood pressure). Your doctor may order for you to receive fluids or albumin (a volume booster) during the procedure through an IV site. Home Care Instructions:  Keep the puncture site clean and dry. No tub baths or swimming until puncture site heals. Showering is acceptable. Resume your normal diet, and resume your normal activity slowly and as you tolerate. If you are short of breath, rest. If shortness of breath does not ease, please call your ordering doctor. Fluid can re-accumulate in the chest and/or in the abdomen. If this should occur, your doctor needs to know as you may need to have the procedure done again. Call If:     You should call your Physician and/or the Radiology Nurse if you notice any signs of infection, like pus draining, or if it is swollen or reddened. Also call if you have a fever, or if you are bleeding from the puncture site more than a small amount on the dressing. Call if the puncture site keeps draining fluid. Some oozing is to be expected, but should slow and then stop. Call if you feel like you have pressure in your abdomen. SEEK IMMEDIATE CARE OR CALL 911 IF YOU SUDDENLY HAVE TROUBLE BREATHING, OR IF YOUR LIPS TURN BLUE, OR IF YOU NOTICE BLOOD IN YOUR SPUTUM. Follow-Up Instructions: Please see your ordering doctor as he/she has requested. To Reach Us:     If you have any questions about your procedure, please call the Interventional Radiology department at [phone]. After business hours (5pm) and weekends, call the answering service at [phone] and ask for the Radiologist on call to be paged. Si tiene Preguntas acerca del procedimiento, por favor llame al departamento de Radiología Intervencional al [phone]. Después de horas de oficina (5 pm) y los fines de [place], llamar al Amari Alonso al [phone] y pregunte por el [facility]. Interventional Radiology General Nurse Discharge    After general anesthesia or intravenous sedation, for 24 hours or while taking prescription Narcotics:  · Limit your activities  · Do not drive and operate hazardous machinery  · Do not make important personal or business decisions  · Do  not drink alcoholic beverages  · If you have not urinated within 8 hours after discharge, please contact your surgeon on call. * Please give a list of your current medications to your Primary Care Provider. * Please update this list whenever your medications are discontinued, doses are     changed, or new medications (including over-the-counter products) are added. * Please carry medication information at all times in case of emergency situations. These are general instructions for a healthy lifestyle:    No smoking/ No tobacco products/ Avoid exposure to second hand smoke  Surgeon General's Warning:  Quitting smoking now greatly reduces serious risk to your health. Obesity, smoking, and sedentary lifestyle greatly increases your risk for illness  A healthy diet, regular physical exercise & weight monitoring are important for maintaining a healthy lifestyle    You may be retaining fluid if you have a history of heart failure or if you experience any of the following symptoms:  Weight gain of 3 pounds or more overnight or 5 pounds in a week, increased swelling in our hands or feet or shortness of breath while lying flat in bed.   Please call your doctor as soon as you notice any of these symptoms; do not wait until your next office visit. Recognize signs and symptoms of STROKE:  F-face looks uneven    A-arms unable to move or move unevenly    S-speech slurred or non-existent    T-time-call 911 as soon as signs and symptoms begin-DO NOT go       Back to bed or wait to see if you get better-TIME IS BRAIN.       Date: 3/26/2021  Discharging Nurse: Sonia Chacon RN breastfeeding exclusively

## 2021-04-30 NOTE — PROGRESS NOTES
Interventional Radiology Post Paracentesis/Thoracentesis Note    4/30/2021    Procedure(s): Ultrasound guided Therapeutic Paracentesis Performed with 8 [place]uran catheter total volume 6360 ml. Samples sent to lab. Preliminary Findings: large clear and yellow. Complications: None    Plan:  Observation, check labs if drawn.           Chest X-Ray:  no    Full dictated report to follow

## 2021-04-30 NOTE — DISCHARGE INSTRUCTIONS
[address]  Department of Interventional Radiology  [facility]  [phone] Office  [phone] Fax    PARACENTESIS DISCHARGE INSTRUCTIONS    General Information:  During this procedure, the doctor will insert a needle into the abdomen to drain fluid. After the procedure, you will be able to take a deep breath much easier. The site of the puncture may ooze the first day. This will decrease and eventually stop. Paracentesis (draining fluid from the abdomen) sometimes makes patients hypotensive (low blood pressure). Your doctor may order for you to receive fluids or albumin (a volume booster) during the procedure through an IV site. Home Care Instructions:  Keep the puncture site clean and dry. No tub baths or swimming until puncture site heals. Showering is acceptable. Resume your normal diet, and resume your normal activity slowly and as you tolerate. If you are short of breath, rest. If shortness of breath does not ease, please call your ordering doctor. Fluid can re-accumulate in the chest and/or in the abdomen. If this should occur, your doctor needs to know as you may need to have the procedure done again. Call If:     You should call your Physician and/or the Radiology Nurse if you notice any signs of infection, like pus draining, or if it is swollen or reddened. Also call if you have a fever, or if you are bleeding from the puncture site more than a small amount on the dressing. Call if the puncture site keeps draining fluid. Some oozing is to be expected, but should slow and then stop. Call if you feel like you have pressure in your abdomen. SEEK IMMEDIATE CARE OR CALL 911 IF YOU SUDDENLY HAVE TROUBLE BREATHING, OR IF YOUR LIPS TURN BLUE, OR IF YOU NOTICE BLOOD IN YOUR SPUTUM. Follow-Up Instructions: Please see your ordering doctor as he/she has requested. To Reach Us:     If you have any questions about your procedure, please call the Interventional Radiology department at [phone]. After business hours (5pm) and weekends, call the answering service at [phone] and ask for the Radiologist on call to be paged. Si tiene Preguntas acerca del procedimiento, por favor llame al departamento de Radiología Intervencional al [phone]. Después de horas de oficina (5 pm) y los fines de [place], llamar al Kristen Sarwat Alonso al [phone] y pregunte por el Radiologo de [facility]. Interventional Radiology General Nurse Discharge    After general anesthesia or intravenous sedation, for 24 hours or while taking prescription Narcotics:  · Limit your activities  · Do not drive and operate hazardous machinery  · Do not make important personal or business decisions  · Do  not drink alcoholic beverages  · If you have not urinated within 8 hours after discharge, please contact your surgeon on call. * Please give a list of your current medications to your Primary Care Provider. * Please update this list whenever your medications are discontinued, doses are     changed, or new medications (including over-the-counter products) are added. * Please carry medication information at all times in case of emergency situations. These are general instructions for a healthy lifestyle:    No smoking/ No tobacco products/ Avoid exposure to second hand smoke  Surgeon General's Warning:  Quitting smoking now greatly reduces serious risk to your health. Obesity, smoking, and sedentary lifestyle greatly increases your risk for illness  A healthy diet, regular physical exercise & weight monitoring are important for maintaining a healthy lifestyle    You may be retaining fluid if you have a history of heart failure or if you experience any of the following symptoms:  Weight gain of 3 pounds or more overnight or 5 pounds in a week, increased swelling in our hands or feet or shortness of breath while lying flat in bed.   Please call your doctor as soon as you notice any of these symptoms; do not wait until your next office visit. Recognize signs and symptoms of STROKE:  F-face looks uneven    A-arms unable to move or move unevenly    S-speech slurred or non-existent    T-time-call 911 as soon as signs and symptoms begin-DO NOT go       Back to bed or wait to see if you get better-TIME IS BRAIN.         Date: 4/30/2021  Discharging Nurse: Rut Lucas

## 2021-05-24 PROBLEM — I48.19 PERSISTENT ATRIAL FIBRILLATION (HCC): Status: ACTIVE | Noted: 2021-01-01

## 2021-05-24 PROBLEM — Z66 DNR (DO NOT RESUSCITATE): Status: ACTIVE | Noted: 2021-01-01

## 2021-05-27 NOTE — PROGRESS NOTES
Interventional Radiology Post Paracentesis/Thoracentesis Note    5/27/2021    Procedure(s): Ultrasound guided Therapeutic Paracentesis Performed with 8 Qatari catheter total volume 5150 ml. Samples sent to lab. Preliminary Findings: medium yellow. Complications: None    Plan:  Observation, check labs if drawn.           Chest X-Ray:  no    Full dictated report to follow

## 2021-05-27 NOTE — DISCHARGE INSTRUCTIONS
[address]  Department of Interventional Radiology  [facility]  [phone] Office  [phone] Fax    PARACENTESIS DISCHARGE INSTRUCTIONS    General Information:  During this procedure, the doctor will insert a needle into the abdomen to drain fluid. After the procedure, you will be able to take a deep breath much easier. The site of the puncture may ooze the first day. This will decrease and eventually stop. Paracentesis (draining fluid from the abdomen) sometimes makes patients hypotensive (low blood pressure). Your doctor may order for you to receive fluids or albumin (a volume booster) during the procedure through an IV site. Home Care Instructions:  Keep the puncture site clean and dry. No tub baths or swimming until puncture site heals. Showering is acceptable. Resume your normal diet, and resume your normal activity slowly and as you tolerate. If you are short of breath, rest. If shortness of breath does not ease, please call your ordering doctor. Fluid can re-accumulate in the chest and/or in the abdomen. If this should occur, your doctor needs to know as you may need to have the procedure done again. Call If:     You should call your Physician and/or the Radiology Nurse if you notice any signs of infection, like pus draining, or if it is swollen or reddened. Also call if you have a fever, or if you are bleeding from the puncture site more than a small amount on the dressing. Call if the puncture site keeps draining fluid. Some oozing is to be expected, but should slow and then stop. Call if you feel like you have pressure in your abdomen. SEEK IMMEDIATE CARE OR CALL 911 IF YOU SUDDENLY HAVE TROUBLE BREATHING, OR IF YOUR LIPS TURN BLUE, OR IF YOU NOTICE BLOOD IN YOUR SPUTUM. Follow-Up Instructions: Please see your ordering doctor as he/she has requested. To Reach Us:     Interventional Radiology General Nurse Discharge    After general anesthesia or intravenous sedation, for 24 hours or while taking prescription Narcotics:  · Limit your activities  · Do not drive and operate hazardous machinery  · Do not make important personal or business decisions  · Do  not drink alcoholic beverages  · If you have not urinated within 8 hours after discharge, please contact your surgeon on call. * Please give a list of your current medications to your Primary Care Provider. * Please update this list whenever your medications are discontinued, doses are     changed, or new medications (including over-the-counter products) are added. * Please carry medication information at all times in case of emergency situations. These are general instructions for a healthy lifestyle:    No smoking/ No tobacco products/ Avoid exposure to second hand smoke  Surgeon General's Warning:  Quitting smoking now greatly reduces serious risk to your health. Obesity, smoking, and sedentary lifestyle greatly increases your risk for illness  A healthy diet, regular physical exercise & weight monitoring are important for maintaining a healthy lifestyle    You may be retaining fluid if you have a history of heart failure or if you experience any of the following symptoms:  Weight gain of 3 pounds or more overnight or 5 pounds in a week, increased swelling in our hands or feet or shortness of breath while lying flat in bed. Please call your doctor as soon as you notice any of these symptoms; do not wait until your next office visit. Recognize signs and symptoms of STROKE:  F-face looks uneven    A-arms unable to move or move unevenly    S-speech slurred or non-existent    T-time-call 911 as soon as signs and symptoms begin-DO NOT go       Back to bed or wait to see if you get better-TIME IS BRAIN. If you have any questions about your procedure, please call the Interventional Radiology department at [phone].  After business hours (5pm) and weekends, call the answering service at [phone] and ask for the Radiologist on call to be paged. Si tiene Preguntas acerca del procedimiento, por favor llame al departamento de Radiología Intervencional al [phone]. Después de horas de oficina (5 pm) y los fines de [place], llamar al Hero Alonso al [phone] y pregunte por el Radiologo de [facility].        Date: 5/27/2021  Discharging Nurse: Waldo Banda RN

## 2021-07-04 PROBLEM — I50.23 ACUTE ON CHRONIC SYSTOLIC HEART FAILURE (HCC): Status: ACTIVE | Noted: 2021-01-01

## 2021-07-04 PROBLEM — K76.6 PORTAL HYPERTENSION (HCC): Status: ACTIVE | Noted: 2021-01-01

## 2021-07-04 PROBLEM — J96.01 ACUTE RESPIRATORY FAILURE WITH HYPOXIA (HCC): Status: ACTIVE | Noted: 2021-01-01

## 2021-07-04 PROBLEM — N18.30 STAGE 3 CHRONIC KIDNEY DISEASE (HCC): Status: ACTIVE | Noted: 2021-01-01

## 2021-07-04 NOTE — ED PROVIDER NOTES
The history is provided by the patient. Shortness of Breath  This is a new problem. The average episode lasts 3 days. The problem occurs continuously. The current episode started more than 2 days ago. The problem has been gradually worsening. Associated symptoms include leg swelling. Pertinent negatives include no fever, no coryza, no rhinorrhea, no cough, no sputum production, no chest pain, no vomiting, no abdominal pain, no rash and no leg pain. It is unknown what precipitated the problem. He has tried nothing for the symptoms. Associated medical issues comments: Colic cirrhosis. Past Medical History:   Diagnosis Date    Abnormal echocardiogram 00/97/2149    systolic function 39-05% and Mod-severe AS    Aortic heart murmur 2019    Per pt  told by Dr. Mayda Salazar ASCVD (arteriosclerotic cardiovascular disease)     calcification on CT scan    BCC (basal cell carcinoma of skin)     Diverticulosis 04/17/2017    CT scan- fatty liver, diverticulosis, umbilical hernia, prostate enlargement, normal appendix    Elevated liver enzymes     history of hepatic steatosis    Elevated PSA, less than 10 ng/ml 2019    GERD (gastroesophageal reflux disease) 06/26/2014    Managed with meds     Hypertension 06/26/2014    Managed with meds     Impotence of organic origin     Liver disease     ascites, per PCP office note dated 7/18/19- Fluid retention is impressive.      Macrocytic anemia     Mixed hyperlipidemia 6/26/2014    Nephrolithiasis     Obesity 5/8/2015    Osteoarthrosis 6/27/2014    Squamous cell carcinoma of skin of chest 2019    Unspecified hypothyroidism 6/26/2014       Past Surgical History:   Procedure Laterality Date    IR PARACENTESIS ABD W IMAGE  12/2/2019    IR PARACENTESIS ABD W IMAGE  12/13/2019    IR PARACENTESIS ABD W IMAGE  1/31/2020    IR PARACENTESIS ABD W IMAGE  3/13/2020    IR PARACENTESIS ABD W IMAGE  4/17/2020    IR PARACENTESIS ABD W IMAGE  6/18/2020    IR PARACENTESIS ABD W IMAGE  3/26/2021    IR PARACENTESIS ABD W IMAGE  4/30/2021    IR PARACENTESIS ABD W IMAGE  5/27/2021         Family History:   Problem Relation Age of Onset    Cancer Father         pancreatic       Social History     Socioeconomic History    Marital status:      Spouse name: Not on file    Number of children: Not on file    Years of education: Not on file    Highest education level: Not on file   Occupational History    Not on file   Tobacco Use    Smoking status: Never Smoker    Smokeless tobacco: Never Used   Substance and Sexual Activity    Alcohol use: Yes     Alcohol/week: 14.0 standard drinks     Types: 14 Glasses of wine per week    Drug use: No    Sexual activity: Not on file   Other Topics Concern    Not on file   Social History Narrative    Not on file     Social Determinants of Health     Financial Resource Strain:     Difficulty of Paying Living Expenses:    Food Insecurity:     Worried About Running Out of Food in the Last Year:     [address] in the Last Year:    Transportation Needs:     Lack of Transportation (Medical):  Lack of Transportation (Non-Medical):    Physical Activity:     Days of Exercise per Week:     Minutes of Exercise per Session:    Stress:     Feeling of Stress :    Social Connections:     Frequency of Communication with Friends and Family:     Frequency of Social Gatherings with Friends and Family:     Attends Temple Services:     Active Member of Clubs or Organizations:     Attends Club or Organization Meetings:     Marital Status:    Intimate Partner Violence:     Fear of Current or Ex-Partner:     Emotionally Abused:     Physically Abused:     Sexually Abused: ALLERGIES: Patient has no known allergies. Review of Systems   Constitutional: Positive for fatigue. Negative for fever. HENT: Negative for congestion and rhinorrhea. Respiratory: Positive for shortness of breath.  Negative for cough and sputum production. Cardiovascular: Positive for leg swelling. Negative for chest pain. Gastrointestinal: Negative for abdominal pain, diarrhea, nausea and vomiting. Endocrine: Negative for polydipsia and polyuria. Genitourinary: Negative for dysuria and hematuria. Musculoskeletal: Negative for back pain and myalgias. Skin: Negative for color change and rash. Neurological: Negative for weakness and numbness. Psychiatric/Behavioral: Negative for confusion. The patient is nervous/anxious. All other systems reviewed and are negative. Vitals:    07/04/21 1842   BP: 104/73   Pulse: 94   Resp: 28   Temp: 98.1 °F (36.7 °C)   SpO2: 95%   Weight: 97.1 kg (214 lb)   Height: 5' 11\" (1.803 m)            Physical Exam  Vitals and nursing note reviewed. HENT:      Head: Normocephalic. Neck:      Vascular: No JVD. Cardiovascular:      Rate and Rhythm: Normal rate and regular rhythm. Heart sounds: Murmur heard. Pulmonary:      Effort: Tachypnea present. Breath sounds: Examination of the right-middle field reveals decreased breath sounds. Examination of the left-middle field reveals decreased breath sounds. Examination of the right-lower field reveals decreased breath sounds. Examination of the left-lower field reveals decreased breath sounds. Decreased breath sounds present. Abdominal:      Tenderness: There is no abdominal tenderness. There is no guarding or rebound. Comments: Distended but nontender with shifting dullness   Musculoskeletal:      Cervical back: Neck supple. Right lower leg: No tenderness. Edema present. Left lower leg: No tenderness. Edema present. Skin:     General: Skin is warm and dry. Neurological:      Mental Status: He is alert and oriented to person, place, and time. MDM  Number of Diagnoses or Management Options  Diagnosis management comments: Shortness of breath due to bilateral pleural effusions and possibly infiltrate.   Will cover and treat for pneumonia. Patient will require admission and consultation with pulmonary for bilateral thoracentesis. May need paracentesis while inpatient as well. 8:48 PM  Covered for pneumonia as well as it is difficult to tell if there is underlying infiltrate effusions are so large. Hospitalist agreed to admit. Amount and/or Complexity of Data Reviewed  Clinical lab tests: ordered and reviewed (Results for orders placed or performed during the hospital encounter of 07/04/21  -CBC WITH AUTOMATED DIFF       Result                      Value             Ref Range           WBC                         9.5               4.3 - 11.1 K*       RBC                         3.23 (L)          4.23 - 5.6 M*       HGB                         8.9 (L)           13.6 - 17.2 *       HCT                         30.0 (L)          41.1 - 50.3 %       MCV                         92.9              79.6 - 97.8 *       MCH                         27.6              26.1 - 32.9 *       MCHC                        29.7 (L)          31.4 - 35.0 *       RDW                         17.2 (H)          11.9 - 14.6 %       PLATELET                    193               150 - 450 K/*       MPV                         10.4              9.4 - 12.3 FL       ABSOLUTE NRBC               0.00              0.0 - 0.2 K/*       DF                          AUTOMATED                             NEUTROPHILS                 88 (H)            43 - 78 %           LYMPHOCYTES                 5 (L)             13 - 44 %           MONOCYTES                   6                 4.0 - 12.0 %        EOSINOPHILS                 0 (L)             0.5 - 7.8 %         BASOPHILS                   0                 0.0 - 2.0 %         IMMATURE GRANULOCYTES       0                 0.0 - 5.0 %         ABS. NEUTROPHILS            8.4 (H)           1.7 - 8.2 K/*       ABS. LYMPHOCYTES            0.5               0.5 - 4.6 K/*       ABS.  MONOCYTES              0.6 0.1 - 1.3 K/*       ABS. EOSINOPHILS            0.0               0.0 - 0.8 K/*       ABS. BASOPHILS              0.0               0.0 - 0.2 K/*       ABS. IMM. GRANS.            0.0               0.0 - 0.5 K/*  -METABOLIC PANEL, COMPREHENSIVE       Result                      Value             Ref Range           Sodium                      141               138 - 145 mm*       Potassium                   3.9               3.5 - 5.1 mm*       Chloride                    105               98 - 107 mmo*       CO2                         29                21 - 32 mmol*       Anion gap                   7                 7 - 16 mmol/L       Glucose                     97                65 - 100 mg/*       BUN                         44 (H)            8 - 23 MG/DL        Creatinine                  1.68 (H)          0.8 - 1.5 MG*       GFR est AA                  52 (L)            >60 ml/min/1*       GFR est non-AA              43 (L)            >60 ml/min/1*       Calcium                     8.9               8.3 - 10.4 M*       Bilirubin, total            0.6               0.2 - 1.1 MG*       ALT (SGPT)                  14                12 - 65 U/L         AST (SGOT)                  21                15 - 37 U/L         Alk.  phosphatase            197 (H)           50 - 136 U/L        Protein, total              7.6               6.3 - 8.2 g/*       Albumin                     2.5 (L)           3.2 - 4.6 g/*       Globulin                    5.1 (H)           2.3 - 3.5 g/*       A-G Ratio                   0.5 (L)           1.2 - 3.5      -MAGNESIUM       Result                      Value             Ref Range           Magnesium                   1.7 (L)           1.8 - 2.4 mg*  -TROPONIN-HIGH SENSITIVITY       Result                      Value             Ref Range           Troponin-High Sensitiv*     192.6 (HH)        0 - 14 pg/mL   -NT-PRO BNP       Result                      Value             Ref Range NT pro-BNP                  >35,000 (H)       5 - 125 PG/ML  -PROTHROMBIN TIME + INR       Result                      Value             Ref Range           Prothrombin time            15.0 (H)          12.5 - 14.7 *       INR                         1.2                              )  Tests in the radiology section of CPT®: ordered and reviewed (XR CHEST PORT    Result Date: 7/4/2021  Chest x-ray, one view INDICATION: Shortness of breath COMPARISON: None. FINDINGS: Bibasilar airspace disease and effusions. No pneumothorax evident. The cardiac silhouette is obscured. Aortic atherosclerotic consultations. Degenerative changes of both shoulders. Bibasilar pleural effusions and airspace disease reflecting atelectasis or infiltrates.    )  Tests in the medicine section of CPT®: ordered and reviewed  Decide to obtain previous medical records or to obtain history from someone other than the patient: yes  Independent visualization of images, tracings, or specimens: yes (EKG interpretation in absence of cardiology  EKG shows a normal sinus rhythm with sinus arrhythmia rate of 90. Inferior and anterior infarct age undetermined, no acute ST elevation or depression. Nonspecific T wave flattening diffusely.   Abnormal EKG  Read by Dr. Chris Mcneill emergency medicine physician  )    Risk of Complications, Morbidity, and/or Mortality  Presenting problems: moderate  Diagnostic procedures: low  Management options: low    Patient Progress  Patient progress: stable         Procedures

## 2021-07-04 NOTE — ED TRIAGE NOTES
Pt to triage via w/c. Pt states he has SOB x 3 days. Hx of liver issues and scheduled for paracentesis on Friday. Pt states he is dizzy as well x 3 days. Pt denies cp, fever, n/v/d, or confusion.

## 2021-07-05 PROBLEM — J96.02 ACUTE RESPIRATORY FAILURE WITH HYPERCAPNIA (HCC): Status: ACTIVE | Noted: 2021-01-01

## 2021-07-05 NOTE — PROCEDURES
PROCEDURE:    US GUIDED THERAPEUTIC PARACENTHESIS    PRE-OP DIAGNOSIS:    ASCITES    POST-OP DIAGNOSIS:    ASCITES    ASSISTANT:    NONE    ANESTHESIA:    LOCAL ANESTHESIA WITH 1% LIDOCAINE 10 CC TOTAL. ABDOMINAL ULTRASOUND FINDINGS:    A [employer] US with body  probe was used to image the abdomen and localize the ascitic fluid. A largel anechoic space was seen consistent with an uncomplicated Ascites. After acquiring an ultrasonic image the paracentesis was performed. DESCRIPTION OF PROCEDURE:    After obtaining informed consent and localizing the safest location for paracenthesis, an area in the  RLQ marked with a blunt, plastic needle cap. A thoracentesis kit was used to perform the procedure. The skin was  cleansed with the supplied  chlorhexididne swab and then draped in the usual fasion. Using the previously marked location as a giude, a 22 G 1.5 inch needle was used to inject 10 cc of 1% lidocaine into the skin and subcutaneous tissue, as well as onto the underlying abdominal  muscles, ascitic fluid was aspirated to assure proper location, prior to removing the anesthesia needle. A 3mm  incision was then made, with the supplied scalpel in the usual fashion to facilitate the insertiopn of the needle. The needle with an 8 [employer]  catheter was then introduced into the peritoneum through the previously made incision in the usual fashion. After aspirating fluid, the  catheter was then placed into the peritoneal cavity using the needle itself as a trocar. The needle was then removed and the catheter was attached to the supplied tubing without complication. 12 cc of Clear Yellow fluid, was aspirated and sent for analysis. Following this 5400 cc of fluid was removed without complication. During the procedure the fluid turned progressively cloudy and milky. Procedure was terminated when no more fluid could be aspirated.   Band aid dressing was appllied, no ascitic fluid leak was noted in the immediate post procedure period.     Fluid was sent for the following tests:    Cell count with differential    Albumin    Protein    Cultures    AFB    Chylomicrones    Triglycerides    EBL:     1 frop      COMPLICATIONS:    none      Sebastián Rogers MD.

## 2021-07-05 NOTE — PROGRESS NOTES
07/05/21 0734   Oxygen Therapy   O2 Sat (%) 100 %   Pulse via Oximetry 86 beats per minute   O2 Device BIPAP   FIO2 (%) 30 %   Respiratory   Respiratory (WDL) X   Respiratory Pattern Regular   Chest/Tracheal Assessment Chest expansion, symmetrical   Breath Sounds Bilateral Diminished   CPAP/BIPAP   Device Mode S/T   Mask Type and Size Medium   Skin Condition intact   PIP Observed 14 cm H20   IPAP (cm H2O) 14 cm H2O   EPAP (cm H2O) 8 cm H2O   Vt Spont (ml) 680 ml   Ve Observed (l/min) 12.2 l/min   Backup Rate 14   Total RR (Spontaneous) 15 breaths per minute   Insp Rise Time (sec) 3   Leak (Estimated) 4 L/min   Pt's Home Machine No   Biomedical Check Performed Yes   Settings Verified Yes   Alarm Settings   High Pressure 50   Low Pressure 5   Low Ve 2   High Rate 50   Low Rate 5

## 2021-07-05 NOTE — CONSULTS
CONSULT NOTE    Karson Baltazar    7/5/2021    Date of Admission:  7/4/2021    The patient's chart is reviewed and the patient is discussed with the staff. Subjective:     Patient is a 70 y.o.  male seen and evaluated at the request of Dr. Mindi Mensah. From   Mr. Jose Francisco Rhodes is a 71 y/o WM with a h/o severe AS, chronic sCHF (LVEF 15-20% May 2021), EtOH cirrhosis with portal HTN, hypothyroidism who was admitted to our service on 7/4 with acute hypoxemic respiratory failure 2/2 acute on chronic sCHF exacerbation. Started on IV Lasix, Bipap and empiric antibiotics and admitted to ICU. Cardiology consulted. Initially started on low dose Levophed for hypotension, though was weaned off rather quickly. He decompensated after admission and equid NIPPV and this consultation. He received 8-0 mg of lasix IV with virtually no response. He is much more comfortable but has a large ascites that is impinging on respiratory mechanics      Review of Systems  Review of systems not obtained due to patient factors.     Patient Active Problem List   Diagnosis Code    GERD (gastroesophageal reflux disease) K21.9    Mixed hyperlipidemia E78.2    Obesity E66.9    Osteoarthrosis M19.90    Hypertension I10    Hypothyroidism E03.9    Diverticulosis of large intestine without hemorrhage K57.30    History of nephrolithiasis Z87.442    Benign prostatic hyperplasia with lower urinary tract symptoms N40.1    Elevated blood sugar R73.9    Coronary artery disease due to lipid rich plaque I25.10, I25.83    Macrocytic anemia D53.9    Elevated liver enzymes R74.8    Elevated PSA R97.20    Alcohol abuse F10.10    Squamous cell carcinoma of skin of chest D28.666    Alcoholic cirrhosis of liver with ascites (HCC) K70.31    Aortic valve stenosis I35.0    Reactive depression F32.9    Anasarca R60.1    Acute renal failure superimposed on stage 3 chronic kidney disease (HCC) N17.9, N18.30    Chronic liver failure without hepatic coma (HCC) K72.10    Chronic pain syndrome G89.4    Arthralgia M25.50    Persistent atrial fibrillation (HCC) I48.19    DNR (do not resuscitate) Z66    Acute on chronic systolic heart failure (HCC) I50.23    Stage 3 chronic kidney disease (HCC) N18.30    Portal hypertension (HCC) K76.6    Acute respiratory failure with hypercapnia (HCC) J96.02           Prior to Admission Medications   Prescriptions Last Dose Informant Patient Reported? Taking? FLUoxetine (PROzac) 20 mg capsule   No Yes   Sig: Take 1 Cap by mouth daily. clotrimazole-betamethasone (LOTRISONE) topical cream   No No   Sig: Apply  to affected area two (2) times a day. Patient taking differently: Apply  to affected area two (2) times daily as needed. furosemide (Lasix) 80 mg tablet   No Yes   Sig: Take 1 and 1/2 tablets daily. Patient taking differently: 80 mg daily. Take 1 tablet daily. levothyroxine (SYNTHROID) 75 mcg tablet   No Yes   Sig: Take 1 Tab by mouth Daily (before breakfast). omeprazole (PRILOSEC) 20 mg capsule   No Yes   Sig: Take 1 capsule by mouth once daily   spironolactone (ALDACTONE) 50 mg tablet   No Yes   Sig: Take 1 Tab by mouth daily. Patient taking differently: Take 25 mg by mouth daily. tamsulosin (Flomax) 0.4 mg capsule   No Yes   Sig: Take 1 Cap by mouth daily.  Indications: enlarged prostate with urination problem, stones in the urinary tract      Facility-Administered Medications: None       Past Medical History:   Diagnosis Date    Abnormal echocardiogram 56/91/2094    systolic function 29-47% and Mod-severe AS    Aortic heart murmur 2019    Per pt  told by Dr. Caroline Marcelo ASCVD (arteriosclerotic cardiovascular disease)     calcification on CT scan    BCC (basal cell carcinoma of skin)     Diverticulosis 04/17/2017    CT scan- fatty liver, diverticulosis, umbilical hernia, prostate enlargement, normal appendix    Elevated liver enzymes     history of hepatic steatosis    Elevated PSA, less than 10 ng/ml 2019    GERD (gastroesophageal reflux disease) 06/26/2014    Managed with meds     Hypertension 06/26/2014    Managed with meds     Impotence of organic origin     Liver disease     ascites, per PCP office note dated 7/18/19- Fluid retention is impressive.  Macrocytic anemia     Mixed hyperlipidemia 6/26/2014    Nephrolithiasis     Obesity 5/8/2015    Osteoarthrosis 6/27/2014    Squamous cell carcinoma of skin of chest 2019    Unspecified hypothyroidism 6/26/2014     Past Surgical History:   Procedure Laterality Date    IR PARACENTESIS ABD W IMAGE  12/2/2019    IR PARACENTESIS ABD W IMAGE  12/13/2019    IR PARACENTESIS ABD W IMAGE  1/31/2020    IR PARACENTESIS ABD W IMAGE  3/13/2020    IR PARACENTESIS ABD W IMAGE  4/17/2020    IR PARACENTESIS ABD W IMAGE  6/18/2020    IR PARACENTESIS ABD W IMAGE  3/26/2021    IR PARACENTESIS ABD W IMAGE  4/30/2021    IR PARACENTESIS ABD W IMAGE  5/27/2021     Social History     Socioeconomic History    Marital status:      Spouse name: Not on file    Number of children: Not on file    Years of education: Not on file    Highest education level: Not on file   Occupational History    Not on file   Tobacco Use    Smoking status: Never Smoker    Smokeless tobacco: Never Used   Substance and Sexual Activity    Alcohol use: Yes     Alcohol/week: 14.0 standard drinks     Types: 14 Glasses of wine per week    Drug use: No    Sexual activity: Not on file   Other Topics Concern    Not on file   Social History Narrative    Not on file     Social Determinants of Health     Financial Resource Strain:     Difficulty of Paying Living Expenses:    Food Insecurity:     Worried About Running Out of Food in the Last Year:     [address] in the Last Year:    Transportation Needs:     Lack of Transportation (Medical):      Lack of Transportation (Non-Medical):    Physical Activity:     Days of Exercise per Week:     Minutes of Exercise per Session:    Stress:     Feeling of Stress :    Social Connections:     Frequency of Communication with Friends and Family:     Frequency of Social Gatherings with Friends and Family:     Attends Rastafarian Services:     Active Member of Clubs or Organizations:     Attends Club or Organization Meetings:     Marital Status:    Intimate Partner Violence:     Fear of Current or Ex-Partner:     Emotionally Abused:     Physically Abused:     Sexually Abused:      Family History   Problem Relation Age of Onset    Cancer Father         pancreatic     No Known Allergies    Current Facility-Administered Medications   Medication Dose Route Frequency    albumin human 25% (BUMINATE) solution 25 g  25 g IntraVENous Q6H    cefTRIAXone (ROCEPHIN) 1 g in 0.9% sodium chloride (MBP/ADV) 50 mL MBP  1 g IntraVENous Q24H    azithromycin (ZITHROMAX) 500 mg in 0.9% sodium chloride 250 mL (VIAL-MATE)  500 mg IntraVENous Q24H    furosemide (LASIX) injection 80 mg  80 mg IntraVENous BID    thiamine HCL (B-1) tablet 100 mg  100 mg Oral DAILY    FLUoxetine (PROzac) capsule 20 mg  20 mg Oral DAILY    levothyroxine (SYNTHROID) tablet 75 mcg  75 mcg Oral ACB    pantoprazole (PROTONIX) tablet 40 mg  40 mg Oral ACB    tamsulosin (FLOMAX) capsule 0.4 mg  0.4 mg Oral DAILY    sodium chloride (NS) flush 5-40 mL  5-40 mL IntraVENous Q8H    sodium chloride (NS) flush 5-40 mL  5-40 mL IntraVENous PRN    senna (SENOKOT) tablet 8.6 mg  1 Tablet Oral DAILY PRN    [Held by provider] heparin (porcine) injection 5,000 Units  5,000 Units SubCUTAneous Q8H    hydrALAZINE (APRESOLINE) 20 mg/mL injection 10 mg  10 mg IntraVENous Q6H PRN    NOREPINephrine (LEVOPHED) 4 mg in 5% dextrose 250 mL infusion  0.5-16 mcg/min IntraVENous TITRATE         Objective:     Vitals:    07/05/21 0501 07/05/21 0516 07/05/21 0531 07/05/21 0734   BP: 90/62 98/65 100/63    Pulse: 80 81 82    Resp: 14 15 16    Temp:       SpO2: 100% 100% 100% 100%   Weight:       Height:           PHYSICAL EXAM     Gen:  the patient is well developed and in no acute distress  HEENT:  Sclera clear, pupils equal, oral mucosa moist  Lungs: Decreased BS  Heart:  RRR without M,G,R  Abd: soft and non-tender; with positive bowel sounds. Ext: warm without cyanosis. There is generalized anasarca. Skin:  no jaundice or rashes, no wounds   Neuro: no gross neuro deficits no asterrhixis    Psychiatric:  alert and oriented x 2    Chest X-ray:        Recent Labs     07/05/21  0516 07/04/21  1850 07/04/21  1849   WBC 10.9 9.5  --    HGB 8.0* 8.9*  --    HCT 28.3* 30.0*  --    * 193  --    INR  --   --  1.2     Recent Labs     07/05/21 0516 07/04/21  1850    141   K 3.9 3.9    105   GLU 85 97   CO2 28 29   BUN 44* 44*   CREA 1.62* 1.68*   MG 2.1 1.7*   CA 8.7 8.9   ALB 2.2* 2.5*     No results for input(s): PH, PCO2, PO2, HCO3 in the last 72 hours.       Assessment:  (Medical Decision Making)     Patient Active Problem List   Diagnosis Code    GERD (gastroesophageal reflux disease) K21.9    Mixed hyperlipidemia E78.2    Obesity E66.9    Osteoarthrosis M19.90    Hypertension I10    Hypothyroidism E03.9    Diverticulosis of large intestine without hemorrhage K57.30    History of nephrolithiasis Z87.442    Benign prostatic hyperplasia with lower urinary tract symptoms N40.1    Elevated blood sugar R73.9    Coronary artery disease due to lipid rich plaque I25.10, I25.83    Macrocytic anemia D53.9    Elevated liver enzymes R74.8    Elevated PSA R97.20    Alcohol abuse F10.10    Squamous cell carcinoma of skin of chest V97.235    Alcoholic cirrhosis of liver with ascites (HCC) K70.31    Aortic valve stenosis I35.0    Reactive depression F32.9    Anasarca R60.1    Acute renal failure superimposed on stage 3 chronic kidney disease (HCC) N17.9, N18.30    Chronic liver failure without hepatic coma (HCC) K72.10    Chronic pain syndrome G89.4    Arthralgia M25.50    Persistent atrial fibrillation (HCC) I48.19    DNR (do not resuscitate) Z66    Acute on chronic systolic heart failure (HCC) I50.23    Stage 3 chronic kidney disease (HCC) N18.30    Portal hypertension (HCC) K76.6    Acute respiratory failure with hypercapnia (HCC) J96.02         Plan:  (Medical Decision Making)     Hospital Problems  Date Reviewed: 5/25/2021        Codes Class Noted POA    Acute on chronic systolic heart failure [facility]) ICD-10-CM: S24.50  ICD-9-CM: 428.23  7/4/2021 Yes    He is hypotensive and intermittently on pressor, partly contributing to this is large tense ascites which is probably decreasing venous return, paracenthesis should help this    Stage 3 chronic kidney disease ([facility] [address] ICD-10-CM: N18.30  ICD-9-CM: 585.3  7/4/2021 Yes    Start Bumex drip along with the albumin for forced diuresis, lasix was ineffective, watch BP- should improve with paracenthessis he is grossly anasarcic    Portal hypertension ([facility] [address] ICD-10-CM: K76.6  ICD-9-CM: 572.3  7/4/2021 Yes        * (Principal) Acute respiratory failure with hypercapnia ([facility] [address] ICD-10-CM: J96.02  ICD-9-CM: 518.81  7/4/2021 Yes    Better with bipap, wean off as tolerated- large volume paracenthesis will significantly improve respiratory mechanics and open up lung volume for function. Anasarca ICD-10-CM: R60.1  ICD-9-CM: 782.3  11/19/2019 Yes        Alcoholic cirrhosis of liver with ascites ([facility] [address] ICD-10-CM: K70.31  ICD-9-CM: 571.2  10/8/2019 Yes        Aortic valve stenosis ICD-10-CM: I35.0  ICD-9-CM: 424.1  10/8/2019 Yes          Paracenthesis at bedside initiated during rounds, will aim to drain as much as patient can tolerate. Check  Total Protein and albumin  Glucose  Cell count with diff  G-stain and culture  AFB culture  Cytology    Thank you very much for this referral.  We appreciate the opportunity to participate in this patient's care. Will follow along with above stated plan.     Sebastián Rogers, MD

## 2021-07-05 NOTE — PROGRESS NOTES
Bedside and verbal shift change report received from  JOSEPH Joaquin and Clemente Vidal RN (offgoing nurse). Report included the following information SBAR, Kardex, Intake/Output, MAR, Recent Results, Med Rec Status, Cardiac Rhythm SA, Alarm Parameters  and Dual Neuro Assessment.      Dual skin assessment completed at bedside: pressure wound on nose, scrotum enlarged, redness on sacrum (list pertinent skin assessment findings)    Dual verification of gtts completed (name of gtts verified): n/a

## 2021-07-05 NOTE — PROGRESS NOTES
Bedside, Verbal and Written shift change report given to Jeimy Buckley RN (oncoming nurse) by Ankit Baires RN (offgoing nurse). Report included the following information SBAR, Kardex, ED Summary, MAR, Recent Results and Med Rec Status, Cardiac Rhythm (sinus tach).

## 2021-07-05 NOTE — CONSULTS
[address] Cardiology Initial Cardiac Evaluation                Date of  Admission: 7/4/2021  6:45 PM     Primary Care Physician: Dr. Gagandeep Garcia  Primary Cardiologist: Dr. Josué Brown   Referring Physician: Dr. Juany Gan  Supervising Physician: Dr. Dagoberto Eugene    HPI: worsening shortness of breath      Erwin Lamb is a 70 y.o. male with past medical history of chronic systolic heart failure, severe AS, chronic liver disease and HLD who presented to the ER with worsening shortness of breath. Patient states that his shortness of breath has become worse over the past several day. Associated symptoms includes increased swelling as well. Denies chest pain. Upon arrival to the ER, CXR shows bibasilar pleural effusions and airspace disease reflecting atelectasis or infiltrates. He was given 80mg IV lasix while in the ER. proBNP was found to be >35K. We have been asked to see patient for heart failure management at the request of the family. Patient was seen in the office recently by primary cardiologist Dr. Josué Brown. Given his low BPs with severe AS, his lopressor was reduced to 12.5mg BID. Per patient, Dr. Josué Brown has told him that having any procedure including LHC for possible TAVR work-up would not be safe. Patient recently was unable to have echocardiogram done in the office due to inability to lie flat. Tonight he is only able to speak in 1-2 word sentences and remains very short of breath.        Past Medical History:   Diagnosis Date    Abnormal echocardiogram 56/64/6103    systolic function 77-02% and Mod-severe AS    Aortic heart murmur 2019    Per pt  told by Dr. Silas Em ASCVD (arteriosclerotic cardiovascular disease)     calcification on CT scan    BCC (basal cell carcinoma of skin)     Diverticulosis 04/17/2017    CT scan- fatty liver, diverticulosis, umbilical hernia, prostate enlargement, normal appendix    Elevated liver enzymes     history of hepatic steatosis    Elevated PSA, less than 10 ng/ml 2019    GERD (gastroesophageal reflux disease) 06/26/2014    Managed with meds     Hypertension 06/26/2014    Managed with meds     Impotence of organic origin     Liver disease     ascites, per PCP office note dated 7/18/19- Fluid retention is impressive.      Macrocytic anemia     Mixed hyperlipidemia 6/26/2014    Nephrolithiasis     Obesity 5/8/2015    Osteoarthrosis 6/27/2014    Squamous cell carcinoma of skin of chest 2019    Unspecified hypothyroidism 6/26/2014      Past Surgical History:   Procedure Laterality Date    IR PARACENTESIS ABD W IMAGE  12/2/2019    IR PARACENTESIS ABD W IMAGE  12/13/2019    IR PARACENTESIS ABD W IMAGE  1/31/2020    IR PARACENTESIS ABD W IMAGE  3/13/2020    IR PARACENTESIS ABD W IMAGE  4/17/2020    IR PARACENTESIS ABD W IMAGE  6/18/2020    IR PARACENTESIS ABD W IMAGE  3/26/2021    IR PARACENTESIS ABD W IMAGE  4/30/2021    IR PARACENTESIS ABD W IMAGE  5/27/2021     No Known Allergies   Family History   Problem Relation Age of Onset    Cancer Father         pancreatic        Current Facility-Administered Medications   Medication Dose Route Frequency    albumin human 25% (BUMINATE) solution 50 g  50 g IntraVENous ONCE    cefTRIAXone (ROCEPHIN) 1 g in 0.9% sodium chloride (MBP/ADV) 50 mL MBP  1 g IntraVENous Q24H    azithromycin (ZITHROMAX) 500 mg in 0.9% sodium chloride 250 mL (VIAL-MATE)  500 mg IntraVENous Q24H    furosemide (LASIX) injection 80 mg  80 mg IntraVENous BID    thiamine HCL (B-1) tablet 100 mg  100 mg Oral DAILY    FLUoxetine (PROzac) capsule 20 mg  20 mg Oral DAILY    levothyroxine (SYNTHROID) tablet 75 mcg  75 mcg Oral ACB    pantoprazole (PROTONIX) tablet 40 mg  40 mg Oral ACB    tamsulosin (FLOMAX) capsule 0.4 mg  0.4 mg Oral DAILY    sodium chloride (NS) flush 5-40 mL  5-40 mL IntraVENous Q8H    sodium chloride (NS) flush 5-40 mL  5-40 mL IntraVENous PRN    senna (SENOKOT) tablet 8.6 mg  1 Tablet Oral DAILY PRN    [Held by provider] heparin (porcine) injection 5,000 Units  5,000 Units SubCUTAneous Q8H    hydrALAZINE (APRESOLINE) 20 mg/mL injection 10 mg  10 mg IntraVENous Q6H PRN    NOREPINephrine (LEVOPHED) 4 mg in 5% dextrose 250 mL infusion  0.5-16 mcg/min IntraVENous TITRATE       Review of Systems   Constitutional: Positive for malaise/fatigue. HENT: Negative. Respiratory: Positive for shortness of breath. Cardiovascular: Positive for leg swelling. Gastrointestinal: Negative. Genitourinary: Negative. Musculoskeletal:        + abdominal swelling   Skin: Negative. Neurological: Negative. Endo/Heme/Allergies: Negative. Psychiatric/Behavioral: Negative. Physical Exam  Vitals:    07/05/21 0446 07/05/21 0501 07/05/21 0516 07/05/21 0531   BP: (!) 88/59 90/62 98/65 100/63   Pulse: 81 80 81 82   Resp: 14 14 15 16   Temp:       SpO2: 100% 100% 100% 100%   Weight:       Height:           Physical Exam:  Physical Exam  Constitutional:       General: He is in acute distress. Appearance: He is ill-appearing. HENT:      Mouth/Throat:      Mouth: Mucous membranes are moist.   Eyes:      Pupils: Pupils are equal, round, and reactive to light. Cardiovascular:      Rate and Rhythm: Normal rate and regular rhythm. Heart sounds: Murmur heard. Pulmonary:      Comments: + rales  Abdominal:      Comments: + swelling   Musculoskeletal:         General: Swelling present. Comments: 4+ edema in bilateral lower extremities   Skin:     General: Skin is warm and dry. Neurological:      Mental Status: He is alert and oriented to person, place, and time.    Psychiatric:         Mood and Affect: Mood normal.         Cardiographics    Telemetry: normal sinus rhythm  ECG: normal sinus rhythm  Echocardiogram: see office echo results    Labs:   Recent Labs     07/05/21  0516 07/04/21  1850 07/04/21  1849    141  --    K 3.9 3.9  --    MG 2.1 1.7*  --    BUN 44* 44*  --    CREA 1.62* 1.68*  --    GLU 85 97  -- WBC 10.9 9.5  --    HGB 8.0* 8.9*  --    HCT 28.3* 30.0*  --    * 193  --    INR  --   --  1.2        Assessment/Plan:     Assessment:        Anasarca -- given IV lasix in the ER and continued on admission. Patient refusing day insertion. Continue diuretic therapy. Pulmonary consulted for thoracentesis      Alcoholic cirrhosis of liver with ascites -- scheduled for paracentesis later this week. IR has been consulted      Aortic valve stenosis -- severe. Not likely a candidate for any procedures. Avoid hypotension if possible. Required low dose IV levophed overnight. Acute on chronic systolic heart failure -- EF < 20%. No BB and/or ACEi/ARB due to low BP. End-stage disease. Patient is currently DNR. Spoke with patient and his wife in the ER regarding disease process. Hospice is recommended. Palliative care consult to be placed to assist with transition at the time of discharge. Stage 3 chronic kidney disease -- monitor closely with IV Lasix      Portal hypertension -- secondary to alcoholic cirrhosis. Need paracentesis      Acute respiratory failure with hypoxia -- per primary team. Patient agreeable to BiPAP if needed. Thank you very much for this referral. We appreciate the opportunity to participate in this patient's care. We will follow along with above stated plan.     Javid Saravia NP  Consulting MD: Noel Saucedo

## 2021-07-05 NOTE — H&P
[facility] History and Physical       Name:  Penny Peterson  Age:71 y.o. Sex:male   :  1950    MRN:  [id number]   PCP:  Radha Reynolds MD      Admit Date:  2021  6:45 PM   Chief Complaint: Shortness of breath    Reason for Admission:   Acute hypoxic respiratory failure    Assessment & Plan:     Acute hypoxic respiratory failure: Likely secondary to volume overload. Volume overload multifactorial but major contributor is acute on chronic heart failure and portal hypertension. #Acute on chronic heart failure: Started on 80 mg IV Lasix twice daily. Strict DIDIER's. Low-sodium diet. Cardiology consult and family's request.  ABG pending. Patient informed me that he has discussed with his cardiologist and he is not a candidate for any intervention. #Portal hypertension: Patient is status with known cirrhosis. Denies any alcohol use but will start patient on thiamine.    -Patient has significant abdominal distention with minimal compression so will order tap for tomorrow. #Community-acquired pneumonia: X-ray showed bilateral infiltrate. Cannot rule out pneumonia at present. Started patient on ceftriaxone and azithromycin to cover, noncancer for community-acquired pneumonia. Will monitor. #CKD: Creatinine at baseline. Will monitor. #Hypothyroidism: Home medication resumed. Diet: DIET NPO  Heparin for DVT prophylaxis. N.p.o. after midnight for possible paracentesis tomorrow. Order placed to remove 7 L of fluid and 50 g of albumin ordered. Patient wants his wife to be power of . He wants to be DNR. He does not want to get intubated even only in case of respiratory failure. Patient and his wife is aware of guarded prognosis. Patient is of high complexity given acute hypoxic respiratory failure in setting of volume overload. Waiting for ABG to decide about final disposition. Nurse is aware to inform me once ABG is back.       Patient has respiratory distress even with speaking and some of that is at baseline but at present is requiring 4 L oxygen. Case discussed with cardiology and pulmonary, place patient on BiPAP and he will go to ICU. Systolic blood pressure 85. Levophed ordered to keep MAP greater than 65. Cardiology and pulmonary already consulted. History of Presenting Illness:     Jarrod Johnson is a 70 y.o. male with medical history of chronic systolic heart failure with a EF less than 20%, severe AS, portal hypertension secondary to alcoholic cirrhosis per patient, hypothyroidism presented to emergency room with chief complaint of shortness of breath. Patient is AOx3 and his wife was present at bedside. Both provided history for the patient. Patient has chronic shortness of breath at rest.  From last 3 days he is feeling really bad shortness of breath. Patient is short of breath even with speaking and  most of it is his baseline. Also noticed worsening of his swelling. Is scheduled to have paracentesis on Friday but he already feels full today. Given his shortness of breath persisted so he decided to come to the emergency room. Denies any chest pain, nausea, vomiting. As per patient is making urine. Denies any productive cough. Severe AS: As per patient, his cardiologist has informed him that even doing cardiac catheterization is high risk. They have discussed and patient is DNR and will be treated with medication only. CHF: Worsening of swelling. Cirrhosis: As per patient he  has cirrhosis secondary to alcohol use but he does not drink alcohol anymore. His wife verified just treatment at bedside. Denies any active cigarette/alcohol or illicit drug use. Family history of pancreatic cancer [his father]. Past medical history, surgical history, social, family history, old records [cardiology notes], ER course noted.     Patient found to have moderate to severe respiratory distress with low oxygen saturation on room air per ER provider. Patient was placed on 4 L oxygen. As per patient he does not wear oxygen at home. He was given antibiotics and Lasix. Medicine service consulted to admit the patient for significant volume overload. Denies any nausea, vomiting, chest pain or palpitations. Review of Systems:  A 14 point review of systems was taken and pertinent positive as per HPI. Past Medical History:   Diagnosis Date    Abnormal echocardiogram 00/05/3734    systolic function 89-42% and Mod-severe AS    Aortic heart murmur 2019    Per pt  told by Dr. Richelle Ernst ASCVD (arteriosclerotic cardiovascular disease)     calcification on CT scan    BCC (basal cell carcinoma of skin)     Diverticulosis 04/17/2017    CT scan- fatty liver, diverticulosis, umbilical hernia, prostate enlargement, normal appendix    Elevated liver enzymes     history of hepatic steatosis    Elevated PSA, less than 10 ng/ml 2019    GERD (gastroesophageal reflux disease) 06/26/2014    Managed with meds     Hypertension 06/26/2014    Managed with meds     Impotence of organic origin     Liver disease     ascites, per PCP office note dated 7/18/19- Fluid retention is impressive.      Macrocytic anemia     Mixed hyperlipidemia 6/26/2014    Nephrolithiasis     Obesity 5/8/2015    Osteoarthrosis 6/27/2014    Squamous cell carcinoma of skin of chest 2019    Unspecified hypothyroidism 6/26/2014       Past Surgical History:   Procedure Laterality Date    IR PARACENTESIS ABD W IMAGE  12/2/2019    IR PARACENTESIS ABD W IMAGE  12/13/2019    IR PARACENTESIS ABD W IMAGE  1/31/2020    IR PARACENTESIS ABD W IMAGE  3/13/2020    IR PARACENTESIS ABD W IMAGE  4/17/2020    IR PARACENTESIS ABD W IMAGE  6/18/2020    IR PARACENTESIS ABD W IMAGE  3/26/2021    IR PARACENTESIS ABD W IMAGE  4/30/2021    IR PARACENTESIS ABD W IMAGE  5/27/2021       Family History : reviewed  Family History   Problem Relation Age of Onset    Cancer Father         pancreatic        Social History     Tobacco Use    Smoking status: Never Smoker    Smokeless tobacco: Never Used   Substance Use Topics    Alcohol use: Yes     Alcohol/week: 14.0 standard drinks     Types: 14 Glasses of wine per week       No Known Allergies    Immunization History   Administered Date(s) Administered    Td 05/08/2015         PTA Medications:  Current Outpatient Medications   Medication Instructions    clotrimazole-betamethasone (LOTRISONE) topical cream Topical, 2 TIMES DAILY    FLUoxetine (PROZAC) 20 mg, Oral, DAILY    furosemide (Lasix) 80 mg tablet Take 1 and 1/2 tablets daily.  levothyroxine (SYNTHROID) 75 mcg, Oral, DAILY BEFORE BREAKFAST    metoprolol tartrate (LOPRESSOR) 25 mg, Oral, 2 TIMES DAILY    omeprazole (PRILOSEC) 20 mg capsule Take 1 capsule by mouth once daily    spironolactone (ALDACTONE) 50 mg, Oral, DAILY    tadalafiL (CIALIS) 20 mg, Oral, AS NEEDED    tamsulosin (FLOMAX) 0.4 mg, Oral, DAILY       Objective:     Patient Vitals for the past 24 hrs:   Temp Pulse Resp BP SpO2   07/04/21 1842 98.1 °F (36.7 °C) 94 28 104/73 95 %       Oxygen Therapy  O2 Sat (%): 95 % (07/04/21 1842)  O2 Device: None (Room air) (07/04/21 1842)    Body mass index is 29.85 kg/m². Physical Exam:    General:  No acut moderate respiratory distress, speaking in full sentences but short of breath. HEENT:  Pup extraocular muscle seems intact, oropharynx is clear   Neck:   Supple, JVD present  Lungs:  Little decreased air entry with crackles otherwise clear to auscultation bilaterally   CV:   Regular rate and rhythm with normal S1 and S2   Abdomen:  Soft, nontender, distended and somewhat compressible, normoactive bowel sounds. Abdominal wall edema present  Extremities:  No cyanosis.   Edema present  Neuro:  N AOx3, moving all 4 extremities, speech normal.  Psych:  Normal mood and affect       Data Reviewed: I have reviewed all labs, meds, and studies. Recent Results (from the past 24 hour(s))   PROTHROMBIN TIME + INR    Collection Time: 07/04/21  6:49 PM   Result Value Ref Range    Prothrombin time 15.0 (H) 12.5 - 14.7 sec    INR 1.2     CBC WITH AUTOMATED DIFF    Collection Time: 07/04/21  6:50 PM   Result Value Ref Range    WBC 9.5 4.3 - 11.1 K/uL    RBC 3.23 (L) 4.23 - 5.6 M/uL    HGB 8.9 (L) 13.6 - 17.2 g/dL    HCT 30.0 (L) 41.1 - 50.3 %    MCV 92.9 79.6 - 97.8 FL    MCH 27.6 26.1 - 32.9 PG    MCHC 29.7 (L) 31.4 - 35.0 g/dL    RDW 17.2 (H) 11.9 - 14.6 %    PLATELET 766 677 - 918 K/uL    MPV 10.4 9.4 - 12.3 FL    ABSOLUTE NRBC 0.00 0.0 - 0.2 K/uL    DF AUTOMATED      NEUTROPHILS 88 (H) 43 - 78 %    LYMPHOCYTES 5 (L) 13 - 44 %    MONOCYTES 6 4.0 - 12.0 %    EOSINOPHILS 0 (L) 0.5 - 7.8 %    BASOPHILS 0 0.0 - 2.0 %    IMMATURE GRANULOCYTES 0 0.0 - 5.0 %    ABS. NEUTROPHILS 8.4 (H) 1.7 - 8.2 K/UL    ABS. LYMPHOCYTES 0.5 0.5 - 4.6 K/UL    ABS. MONOCYTES 0.6 0.1 - 1.3 K/UL    ABS. EOSINOPHILS 0.0 0.0 - 0.8 K/UL    ABS. BASOPHILS 0.0 0.0 - 0.2 K/UL    ABS. IMM. GRANS. 0.0 0.0 - 0.5 K/UL   METABOLIC PANEL, COMPREHENSIVE    Collection Time: 07/04/21  6:50 PM   Result Value Ref Range    Sodium 141 138 - 145 mmol/L    Potassium 3.9 3.5 - 5.1 mmol/L    Chloride 105 98 - 107 mmol/L    CO2 29 21 - 32 mmol/L    Anion gap 7 7 - 16 mmol/L    Glucose 97 65 - 100 mg/dL    BUN 44 (H) 8 - 23 MG/DL    Creatinine 1.68 (H) 0.8 - 1.5 MG/DL    GFR est AA 52 (L) >60 ml/min/1.73m2    GFR est non-AA 43 (L) >60 ml/min/1.73m2    Calcium 8.9 8.3 - 10.4 MG/DL    Bilirubin, total 0.6 0.2 - 1.1 MG/DL    ALT (SGPT) 14 12 - 65 U/L    AST (SGOT) 21 15 - 37 U/L    Alk.  phosphatase 197 (H) 50 - 136 U/L    Protein, total 7.6 6.3 - 8.2 g/dL    Albumin 2.5 (L) 3.2 - 4.6 g/dL    Globulin 5.1 (H) 2.3 - 3.5 g/dL    A-G Ratio 0.5 (L) 1.2 - 3.5     MAGNESIUM    Collection Time: 07/04/21  6:50 PM   Result Value Ref Range    Magnesium 1.7 (L) 1.8 - 2.4 mg/dL   TROPONIN-HIGH SENSITIVITY Collection Time: 07/04/21  6:50 PM   Result Value Ref Range    Troponin-High Sensitivity 192.6 (HH) 0 - 14 pg/mL   NT-PRO BNP    Collection Time: 07/04/21  6:50 PM   Result Value Ref Range    NT pro-BNP >35,000 (H) 5 - 125 PG/ML       EKG Results     Procedure 720 Value Units Date/Time    EKG [[id number]]     Order Status: Completed           All Micro Results     Procedure Component Value Units Date/Time    CULTURE, BODY FLUID Elsworth Buck STAIN [[id number]]     Order Status: Sent Specimen: Body Fluid from Ascitic Fluid     CULTURE, BLOOD [[id number]]     Order Status: Sent Specimen: Blood     CULTURE, BLOOD [[id number]]     Order Status: Sent Specimen: Blood           Other Studies:  XR CHEST PORT    Result Date: 7/4/2021  Chest x-ray, one view INDICATION: Shortness of breath COMPARISON: None. FINDINGS: Bibasilar airspace disease and effusions. No pneumothorax evident. The cardiac silhouette is obscured. Aortic atherosclerotic consultations. Degenerative changes of both shoulders. Bibasilar pleural effusions and airspace disease reflecting atelectasis or infiltrates.         Medications:  Medications Administered      Medications Administered     cefTRIAXone (ROCEPHIN) 2 g in 0.9% sodium chloride (MBP/ADV) 50 mL MBP     Admin Date  07/04/2021 Action  New Bag Dose  2 g Rate  100 mL/hr Route  IntraVENous Administered By  Farrah Kong RN                      Problem List:     Hospital Problems as of 7/4/2021 Date Reviewed: 5/25/2021        Codes Class Noted - Resolved POA    Acute on chronic systolic heart failure ([email or web address] ICD-10-CM: I50.23  ICD-9-CM: 428.23  7/4/2021 - Present Unknown        Stage 3 chronic kidney disease ([facility] ICD-10-CM: N18.30  ICD-9-CM: 585.3  7/4/2021 - Present Unknown        Portal hypertension ([facility] ICD-10-CM: K76.6  ICD-9-CM: 572.3  7/4/2021 - Present Unknown        Anasarca ICD-10-CM: R60.1  ICD-9-CM: 782.3  11/19/2019 - Present Yes        Alcoholic cirrhosis of liver with ascites ([address] ICD-10-CM: K70.31  ICD-9-CM: 571.2  10/8/2019 - Present Yes        Aortic valve stenosis ICD-10-CM: I35.0  ICD-9-CM: 424.1  10/8/2019 - Present Yes                 Signed By: Haeth Montana MD   [facility]    July 4, 2021

## 2021-07-05 NOTE — PROGRESS NOTES
Paracentesis completed by Dr. Maria Elena Tabor at the bedside. Pt required levophed at 4mcg per min. Pt tolerated well; 5.4L removed; sample sent to lab.

## 2021-07-05 NOTE — ACP (ADVANCE CARE PLANNING)
Advance Care Planning     General Advance Care Planning (ACP) Conversation      Date of Conversation: 7/4/2021    Healthcare Decision Maker: Krystal Burr [phone]    Click here to complete [address] including selection of the Healthcare Decision Maker Relationship (ie \"Primary\")  Today we documented Decision Maker(s) consistent with Legal Next of Kin hierarchy. Content/Action Overview:   No LW/HCPOA on file currently. Legal NOK, spouse. DNR per MD orders.      Length of Voluntary ACP Conversation in minutes:  <16 minutes (Non-Billable)    Kevin Gamble RN

## 2021-07-05 NOTE — PROGRESS NOTES
Attempted to place a condom catheter; genitalia was too edematous to place. Pt refused an indwelling day catheter. No urine output noted yet; Bumex gtt will be started after it is received from pharmacy.

## 2021-07-05 NOTE — PROGRESS NOTES
Occupational Therapy Note:    OT orders received, chart reviewed, and evaluation attempted. Patient with plans for thoracentesis today. Will hold OT evaluation and check back tomorrow as schedule and pt's condition permits.     Rl Melgar, OTR/L, CLT

## 2021-07-05 NOTE — PROGRESS NOTES
Initial visit made to patient and a prayer was provided. His wife, Checo Roberts was present.     Shruthi Olmstead, [address] [facility]

## 2021-07-05 NOTE — PROGRESS NOTES
Groin continues to be swollen, +3 edema noted, skin is tight over scrotum without breakdown. He reports sensation without pain and reports his groin is always swollen at home as well. Scrotum elevated with sling. Denies any need for pain medication.

## 2021-07-05 NOTE — PROGRESS NOTES
Pt seen in ICU s/p admission acute respiratory failure with hypoxia. Wife, Elio alvarez, at bedside. Confirms demographics. Pt does not have supplemental insurance or drug plan. Pt uses walker at home and spouse provides transportation. No current HH or rehab. Discussed possible needs and that CM will follow for d/c needs/POC. Care Management Interventions  PCP Verified by CM: Yes (Dr. Sree Palomares)  Mode of Transport at Discharge:  Other (see comment)  Transition of Care Consult (CM Consult): Discharge Planning  Discharge Durable Medical Equipment:  (walker)  Physical Therapy Consult: Yes  Occupational Therapy Consult: Yes  Current Support Network: Lives with Spouse  Confirm Follow Up Transport: Family  Freedom of Choice List was Provided with Basic Dialogue that Supports the Patient's Individualized Plan of Care/Goals, Treatment Preferences and Shares the Quality Data Associated with the Providers?: Yes   Resource Information Provided?:  (MCR/no supplemental/no drug plan)  Discharge Location  Discharge Placement: Unable to determine at this time

## 2021-07-05 NOTE — PROGRESS NOTES
PT Note    Orders received, chart reviewed. Attempted to see pnt this AM for therapy but he is still on bipap and about to undergo a thoracocentesis. Will hold today and attempt again tomorrow.     Thank you,  Emi Miller, PT, DPT

## 2021-07-05 NOTE — PROGRESS NOTES
TRANSFER - IN REPORT:    Verbal report received from [facility], [address] [facility] (name) on Lorence Osler  being received from [address]01(unit) for routine progression of care      Report consisted of patients Situation, Background, Assessment and   Recommendations(SBAR). Information from the following report(s) SBAR, Kardex, ED Summary, Procedure Summary, Intake/Output, MAR, Recent Results, Cardiac Rhythm SR and Alarm Parameters  was reviewed with the receiving nurse. Opportunity for questions and clarification was provided. Assessment completed upon patients arrival to unit and care assumed.

## 2021-07-05 NOTE — ED NOTES
TRANSFER - OUT REPORT:    Verbal report given to Arjun RN(name) on Lorence Osler  being transferred to [facility](unit) for routine progression of care       Report consisted of patients Situation, Background, Assessment and   Recommendations(SBAR). Information from the following report(s) SBAR, ED Summary, [facility] and Recent Results was reviewed with the receiving nurse. Lines:   Peripheral IV 07/04/21 Left Antecubital (Active)        Opportunity for questions and clarification was provided.       Patient transported with:   Monitor/O2

## 2021-07-05 NOTE — PROGRESS NOTES
Two RNs attempted to insert the day catheter; both were unsuccessful. +3 edema noted upon assessment prior; this appears to be the pt's baseline. Dr. Adolfo Linares was notified of this. However, the pt is now voiding into a urinal with assistance from his wife following the start of bumetanide.

## 2021-07-05 NOTE — PROGRESS NOTES
Hospitalist Progress Note     Admit Date:  2021  6:45 PM   Name:  Sonya Rowe   Age:  70 y.o.  :  1950   MRN:  [id number]   PCP:  Theron Jama MD  Presenting Complaint: Shortness of Breath and Dizziness    Initial Admission Diagnosis: Acute respiratory failure with hypoxia ([facility] [J96.01]     Assessment and Plan:     Hospital Problems as of 2021 Date Reviewed: 2021        Codes Class Noted - Resolved POA    Acute on chronic systolic heart failure ([facility] ICD-10-CM: I50.23  ICD-9-CM: 428.23  2021 - Present Yes        Stage 3 chronic kidney disease ([facility] ICD-10-CM: N18.30  ICD-9-CM: 585.3  2021 - Present Yes        Portal hypertension ([facility] ICD-10-CM: K76.6  ICD-9-CM: 572.3  2021 - Present Yes        * (Principal) Acute respiratory failure with hypercapnia ([facility] ICD-10-CM: J96.02  ICD-9-CM: 518.81  2021 - Present Yes        Anasarca ICD-10-CM: R60.1  ICD-9-CM: 782.3  2019 - Present Yes        Alcoholic cirrhosis of liver with ascites ([facility] ICD-10-CM: K70.31  ICD-9-CM: 571.2  10/8/2019 - Present Yes        Aortic valve stenosis ICD-10-CM: I35.0  ICD-9-CM: 424.1  10/8/2019 - Present Yes              Plan:  # Acute hypercapneic respiratory failure 2/2 AoC sCHF   - Likely can try off Bipap today. Con't diuresis. Empiric abx. Cardiology following. Refused Neville in ER, unclear urine output. Albumin added. # HypoMg   - Replaced IV    # Cirrhosis   - Therapeutic para     # Bilateral infiltrates on CXR   - Likely fluid, con't diuresis, rapid COVID neg    # CKD   - At baseline. Monitor with diuresis. # Hypothyroidism   - Synthroid    Other listed chronic conditions stable but add to medical complexity; continue current management. Discharge planning: Uncertain. Chronically ill, high risk of further decline. DNR.    Diet:  DIET NPO Sips of Water with Meds  ADULT DIET Regular; Low Fat/Low Chol/High Fiber/LARA; Low Sodium (2 gm); 60 to 80 gm  DVT ppx: [facility]    Hospital Course:   Mr. Carlos Enrique Sorensen is a 71 y/o WM with a h/o severe AS, chronic sCHF (LVEF 15-20% May 2021), EtOH cirrhosis with portal HTN, hypothyroidism who was admitted to our service on 7/4 with acute hypoxemic respiratory failure 2/2 acute on chronic sCHF exacerbation. Started on IV Lasix, Bipap and empiric antibiotics and admitted to ICU. Cardiology consulted. Initially started on low dose Levophed for hypotension, though was weaned off rather quickly. 24hr Events/Subjective:   7/5: In bed, looks comfortable on Bipap but woke up and said he's not feeling better then went back to sleep. Denies pain, N/V/D. Abdo quite distended. O2 sats 100%.      No other complaints  Objective:     Patient Vitals for the past 24 hrs:   Temp Pulse Resp BP SpO2   07/05/21 0734     100 %   07/05/21 0531  82 16 100/63 100 %   07/05/21 0516  81 15 98/65 100 %   07/05/21 0501  80 14 90/62 100 %   07/05/21 0446  81 14 (!) 88/59 100 %   07/05/21 0442     100 %   07/05/21 0431  81 14 (!) 88/59 100 %   07/05/21 0416  81 14 (!) 89/60 100 %   07/05/21 0401  86 13 100/69 100 %   07/05/21 0400  86      07/05/21 0346  86 13 100/70 100 %   07/05/21 0331  82 16 97/68 100 %   07/05/21 0316  86 16 96/68 100 %   07/05/21 0301  85 13 (!) 88/62 100 %   07/05/21 0300 97.6 °F (36.4 °C) 85 13 (!) 88/62 100 %   07/05/21 0246  82 13 93/62 100 %   07/05/21 0231  83 14 92/62 100 %   07/05/21 0216  83 12 (!) 89/62 100 %   07/05/21 0201  84 14 90/64 100 %   07/05/21 0141  82 15 (!) 81/55 100 %   07/05/21 0101  (!) 106 14 (!) 89/58 100 %   07/05/21 0051  82 15 (!) 90/55 100 %   07/05/21 0041  92  92/60 100 %   07/05/21 0031  (!) 105  115/62 100 %   07/05/21 0016  85 11 101/67 100 %   07/05/21 0002  85 19 101/71 99 %   07/04/21 2354 97.7 °F (36.5 °C) 88 24 (!) 95/54 98 %   07/04/21 2321  83 22 (!) 79/53    07/04/21 2301  97 16 (!) 78/56    07/04/21 2300     95 %   07/04/21 2250  85 20 (!) 82/60 98 % 07/04/21 2217  98 (!) 34 98/70    07/04/21 2204  95 22 97/69 100 %   07/04/21 2040  91 13 95/71 98 %   07/04/21 2021  91 15 99/71 98 %   07/04/21 1842 98.1 °F (36.7 °C) 94 28 104/73 95 %     Oxygen Therapy  O2 Sat (%): 100 % (07/05/21 0734)  Pulse via Oximetry: 86 beats per minute (07/05/21 0734)  O2 Device: BIPAP (07/05/21 0734)  FIO2 (%): 30 % (07/05/21 0734)    Estimated body mass index is 30.53 kg/m² as calculated from the following:    Height as of this encounter: 5' 11\" (1.803 m). Weight as of this encounter: 99.3 kg (218 lb 14.7 oz). Intake/Output Summary (Last 24 hours) at 7/5/2021 0757  Last data filed at 7/5/2021 0501  Gross per 24 hour   Intake 372 ml   Output    Net 372 ml       *Note that automatically entered I/Os may not be accurate; dependent on patient compliance with collection and accurate  by SIVI. General:    Chronically ill appearing. Somnolent but NAD. CV:   RRR. 3/6 ROBBIN at USB. No r/g. No edema. No JVD  Lungs:   Decreased, clear anteriorly, unlabored, on Bipap with bedside sats high 90s-100%. Abdomen:   Distended, tense ascites. Extremities: Warm and dry. No cyanosis. 3+ b/l LE pitting edema up to thighs. Skin:     No rashes. Normal coloration  Neuro:  Unable to fully assess, fatigued.     I have reviewed all labs, meds, and other studies shown below:  Last 24hr Labs:  Recent Results (from the past 24 hour(s))   PROTHROMBIN TIME + INR    Collection Time: 07/04/21  6:49 PM   Result Value Ref Range    Prothrombin time 15.0 (H) 12.5 - 14.7 sec    INR 1.2     CBC WITH AUTOMATED DIFF    Collection Time: 07/04/21  6:50 PM   Result Value Ref Range    WBC 9.5 4.3 - 11.1 K/uL    RBC 3.23 (L) 4.23 - 5.6 M/uL    HGB 8.9 (L) 13.6 - 17.2 g/dL    HCT 30.0 (L) 41.1 - 50.3 %    MCV 92.9 79.6 - 97.8 FL    MCH 27.6 26.1 - 32.9 PG    MCHC 29.7 (L) 31.4 - 35.0 g/dL    RDW 17.2 (H) 11.9 - 14.6 %    PLATELET 355 031 - 392 K/uL    MPV 10.4 9.4 - 12.3 FL    ABSOLUTE NRBC 0.00 0.0 - 0.2 K/uL    DF AUTOMATED      NEUTROPHILS 88 (H) 43 - 78 %    LYMPHOCYTES 5 (L) 13 - 44 %    MONOCYTES 6 4.0 - 12.0 %    EOSINOPHILS 0 (L) 0.5 - 7.8 %    BASOPHILS 0 0.0 - 2.0 %    IMMATURE GRANULOCYTES 0 0.0 - 5.0 %    ABS. NEUTROPHILS 8.4 (H) 1.7 - 8.2 K/UL    ABS. LYMPHOCYTES 0.5 0.5 - 4.6 K/UL    ABS. MONOCYTES 0.6 0.1 - 1.3 K/UL    ABS. EOSINOPHILS 0.0 0.0 - 0.8 K/UL    ABS. BASOPHILS 0.0 0.0 - 0.2 K/UL    ABS. IMM. GRANS. 0.0 0.0 - 0.5 K/UL   METABOLIC PANEL, COMPREHENSIVE    Collection Time: 07/04/21  6:50 PM   Result Value Ref Range    Sodium 141 138 - 145 mmol/L    Potassium 3.9 3.5 - 5.1 mmol/L    Chloride 105 98 - 107 mmol/L    CO2 29 21 - 32 mmol/L    Anion gap 7 7 - 16 mmol/L    Glucose 97 65 - 100 mg/dL    BUN 44 (H) 8 - 23 MG/DL    Creatinine 1.68 (H) 0.8 - 1.5 MG/DL    GFR est AA 52 (L) >60 ml/min/1.73m2    GFR est non-AA 43 (L) >60 ml/min/1.73m2    Calcium 8.9 8.3 - 10.4 MG/DL    Bilirubin, total 0.6 0.2 - 1.1 MG/DL    ALT (SGPT) 14 12 - 65 U/L    AST (SGOT) 21 15 - 37 U/L    Alk.  phosphatase 197 (H) 50 - 136 U/L    Protein, total 7.6 6.3 - 8.2 g/dL    Albumin 2.5 (L) 3.2 - 4.6 g/dL    Globulin 5.1 (H) 2.3 - 3.5 g/dL    A-G Ratio 0.5 (L) 1.2 - 3.5     MAGNESIUM    Collection Time: 07/04/21  6:50 PM   Result Value Ref Range    Magnesium 1.7 (L) 1.8 - 2.4 mg/dL   TROPONIN-HIGH SENSITIVITY    Collection Time: 07/04/21  6:50 PM   Result Value Ref Range    Troponin-High Sensitivity 192.6 (HH) 0 - 14 pg/mL   NT-PRO BNP    Collection Time: 07/04/21  6:50 PM   Result Value Ref Range    NT pro-BNP >35,000 (H) 5 - 125 PG/ML   PROCALCITONIN    Collection Time: 07/04/21  6:50 PM   Result Value Ref Range    Procalcitonin 0.73 ng/mL   TSH 3RD GENERATION    Collection Time: 07/04/21  6:50 PM   Result Value Ref Range    TSH 8.460 (H) 0.358 - 3.740 uIU/mL   T4, FREE    Collection Time: 07/04/21  6:50 PM   Result Value Ref Range    T4, Free 1.3 0.78 - 1.46 NG/DL   EKG, 12 LEAD, INITIAL    Collection Time: 07/04/21 6:57 PM   Result Value Ref Range    Ventricular Rate 90 BPM    Atrial Rate 90 BPM    P-R Interval 180 ms    QRS Duration 80 ms    Q-T Interval 328 ms    QTC Calculation (Bezet) 401 ms    Calculated P Axis 85 degrees    Calculated R Axis 89 degrees    Calculated T Axis 159 degrees    Diagnosis       !! AGE AND GENDER SPECIFIC ECG ANALYSIS !! Sinus rhythm with sinus arrhythmia  Inferior infarct (cited on or before 04-JUL-2021)  Anterior infarct (cited on or before 04-JUL-2021)  Abnormal ECG  When compared with ECG of 04-JUL-2021 18:55,  Sinus rhythm has replaced Atrial fibrillation     CULTURE, BLOOD    Collection Time: 07/04/21 10:03 PM    Specimen: Blood   Result Value Ref Range    Special Requests: RIGHT  HAND        Culture result: NO GROWTH AFTER 7 HOURS     BLOOD GAS, ARTERIAL POC    Collection Time: 07/04/21 11:00 PM   Result Value Ref Range    Device: BIPAP MASK      FIO2 (POC) 40 %    pH (POC) 7.32 (L) 7.35 - 7.45      pCO2 (POC) 57.5 (H) 35 - 45 MMHG    pO2 (POC) 127 (H) 75 - 100 MMHG    HCO3 (POC) 29.9 (H) 22 - 26 MMOL/L    sO2 (POC) 98.5 (H) 95 - 98 %    Base excess (POC) 3.0 mmol/L    Mode BILEVEL      PEEP/CPAP (POC) 8 cmH2O    PIP (POC) 16      Allens test (POC) NOT APPLICABLE      Total resp.  rate 14      Site LEFT BRACHIAL      Specimen type (POC) ARTERIAL      Performed by Gal    CULTURE, BLOOD    Collection Time: 07/04/21 11:21 PM    Specimen: Blood   Result Value Ref Range    Special Requests: RIGHT  HAND        Culture result: NO GROWTH AFTER 7 HOURS     LACTIC ACID    Collection Time: 07/04/21 11:21 PM   Result Value Ref Range    Lactic acid 2.0 0.4 - 2.0 MMOL/L   COVID-19 RAPID TEST    Collection Time: 07/05/21 12:00 AM   Result Value Ref Range    Specimen source NASAL      COVID-19 rapid test Not detected NOTD     TROPONIN-HIGH SENSITIVITY    Collection Time: 07/05/21  2:02 AM   Result Value Ref Range    Troponin-High Sensitivity 174.3 (HH) 0 - 14 pg/mL   METABOLIC PANEL, COMPREHENSIVE    Collection Time: 07/05/21  5:16 AM   Result Value Ref Range    Sodium 142 136 - 145 mmol/L    Potassium 3.9 3.5 - 5.1 mmol/L    Chloride 106 98 - 107 mmol/L    CO2 28 21 - 32 mmol/L    Anion gap 8 7 - 16 mmol/L    Glucose 85 65 - 100 mg/dL    BUN 44 (H) 8 - 23 MG/DL    Creatinine 1.62 (H) 0.8 - 1.5 MG/DL    GFR est AA 54 (L) >60 ml/min/1.73m2    GFR est non-AA 45 (L) >60 ml/min/1.73m2    Calcium 8.7 8.3 - 10.4 MG/DL    Bilirubin, total 0.5 0.2 - 1.1 MG/DL    ALT (SGPT) 12 12 - 65 U/L    AST (SGOT) 16 15 - 37 U/L    Alk. phosphatase 182 (H) 50 - 136 U/L    Protein, total 7.0 6.3 - 8.2 g/dL    Albumin 2.2 (L) 3.2 - 4.6 g/dL    Globulin 4.8 (H) 2.3 - 3.5 g/dL    A-G Ratio 0.5 (L) 1.2 - 3.5     MAGNESIUM    Collection Time: 07/05/21  5:16 AM   Result Value Ref Range    Magnesium 2.1 1.8 - 2.4 mg/dL   CBC WITH AUTOMATED DIFF    Collection Time: 07/05/21  5:16 AM   Result Value Ref Range    WBC 10.9 4.3 - 11.1 K/uL    RBC 2.94 (L) 4.23 - 5.6 M/uL    HGB 8.0 (L) 13.6 - 17.2 g/dL    HCT 28.3 (L) 41.1 - 50.3 %    MCV 96.3 79.6 - 97.8 FL    MCH 27.2 26.1 - 32.9 PG    MCHC 28.3 (L) 31.4 - 35.0 g/dL    RDW 17.1 (H) 11.9 - 14.6 %    PLATELET 335 (L) 482 - 450 K/uL    MPV 9.9 9.4 - 12.3 FL    ABSOLUTE NRBC 0.00 0.0 - 0.2 K/uL    DF AUTOMATED      NEUTROPHILS 84 (H) 43 - 78 %    LYMPHOCYTES 6 (L) 13 - 44 %    MONOCYTES 10 4.0 - 12.0 %    EOSINOPHILS 0 (L) 0.5 - 7.8 %    BASOPHILS 0 0.0 - 2.0 %    IMMATURE GRANULOCYTES 0 0.0 - 5.0 %    ABS. NEUTROPHILS 9.1 (H) 1.7 - 8.2 K/UL    ABS. LYMPHOCYTES 0.6 0.5 - 4.6 K/UL    ABS. MONOCYTES 1.0 0.1 - 1.3 K/UL    ABS. EOSINOPHILS 0.0 0.0 - 0.8 K/UL    ABS. BASOPHILS 0.0 0.0 - 0.2 K/UL    ABS. IMM.  GRANS. 0.0 0.0 - 0.5 K/UL   LACTIC ACID    Collection Time: 07/05/21  5:16 AM   Result Value Ref Range    Lactic acid 1.8 0.4 - 2.0 MMOL/L       All Micro Results     Procedure Component Value Units Date/Time    CULTURE, BLOOD [[id number]] Collected: 07/04/21 2209    Order Status: Completed Specimen: Blood Updated: 07/05/21 0700     Special Requests: --        RIGHT  HAND       Culture result: NO GROWTH AFTER 7 HOURS       CULTURE, BLOOD [[id number]] Collected: 07/04/21 2321    Order Status: Completed Specimen: Blood Updated: 07/05/21 0700     Special Requests: --        RIGHT  HAND       Culture result: NO GROWTH AFTER 7 HOURS       COVID-19 RAPID TEST [[id number]] Collected: 07/05/21 0000    Order Status: Completed Specimen: Nasopharyngeal Updated: 07/05/21 0020     Specimen source NASAL        COVID-19 rapid test Not detected        Comment:      The specimen is NEGATIVE for SARS-CoV-2, the novel coronavirus associated with COVID-19. A negative result does not rule out COVID-19. This test has been authorized by the FDA under an Emergency Use Authorization (EUA) for use by authorized laboratories. Fact sheet for Healthcare Providers: ET Waterdate.co.nz  Fact sheet for Patients: Original.co.nz       Methodology: Isothermal Nucleic Acid Amplification         CULTURE, BODY FLUID Arlette Speed STAIN [[id number]]     Order Status: Canceled Specimen:  Body Fluid from Ascitic Fluid           Current Meds:  Current Facility-Administered Medications   Medication Dose Route Frequency    albumin human 25% (BUMINATE) solution 25 g  25 g IntraVENous Q6H    cefTRIAXone (ROCEPHIN) 1 g in 0.9% sodium chloride (MBP/ADV) 50 mL MBP  1 g IntraVENous Q24H    azithromycin (ZITHROMAX) 500 mg in 0.9% sodium chloride 250 mL (VIAL-MATE)  500 mg IntraVENous Q24H    furosemide (LASIX) injection 80 mg  80 mg IntraVENous BID    thiamine HCL (B-1) tablet 100 mg  100 mg Oral DAILY    FLUoxetine (PROzac) capsule 20 mg  20 mg Oral DAILY    levothyroxine (SYNTHROID) tablet 75 mcg  75 mcg Oral ACB    pantoprazole (PROTONIX) tablet 40 mg  40 mg Oral ACB    tamsulosin (FLOMAX) capsule 0.4 mg  0.4 mg Oral DAILY    sodium chloride (NS) flush 5-40 mL  5-40 mL IntraVENous Q8H    sodium chloride (NS) flush 5-40 mL  5-40 mL IntraVENous PRN    senna (SENOKOT) tablet 8.6 mg  1 Tablet Oral DAILY PRN    [Held by provider] heparin (porcine) injection 5,000 Units  5,000 Units SubCUTAneous Q8H    hydrALAZINE (APRESOLINE) 20 mg/mL injection 10 mg  10 mg IntraVENous Q6H PRN    NOREPINephrine (LEVOPHED) 4 mg in 5% dextrose 250 mL infusion  0.5-16 mcg/min IntraVENous TITRATE       Other Studies:    XR CHEST PORT    Result Date: 7/4/2021  Chest x-ray, one view INDICATION: Shortness of breath COMPARISON: None. FINDINGS: Bibasilar airspace disease and effusions. No pneumothorax evident. The cardiac silhouette is obscured. Aortic atherosclerotic consultations. Degenerative changes of both shoulders. Bibasilar pleural effusions and airspace disease reflecting atelectasis or infiltrates.       Signed:  Juanito Delgado MD

## 2021-07-06 PROBLEM — K65.2 SBP (SPONTANEOUS BACTERIAL PERITONITIS) (HCC): Status: ACTIVE | Noted: 2021-01-01

## 2021-07-06 PROBLEM — J90 PLEURAL EFFUSION ON RIGHT: Status: ACTIVE | Noted: 2021-01-01

## 2021-07-06 NOTE — CONSULTS
Palliative Care    Patient: Penny Peterson MRN: [id number]  SSN: [id number]    YOB: 1950  Age: 70 y.o. Sex: male       Date of Request: 7/5/2021  Date of Consult:  7/6/2021  Reason for Consult:  goals of care  Requesting Physician: Dr Kingsley Prieto     Assessment/Plan:     Principal Diagnosis:    Dyspnea  R06.00    Additional Diagnoses:   · Acute Respiratory Failure, Unspecified  J96.00  · Advance Care Planning Counseling Z71.89  · Ascites  R18.8  · Debility, Unspecified  R53.81  · Edema  R60.9  · Fatigue, Lethargy  R53.83  · Frailty  R54  · Counseling, Encounter for Medical Advice  Z71.9  · Encounter for Palliative Care  Z51.5    Palliative Performance Scale (PPS):       Medical Decision Making:   Reviewed and summarized notes from admission to present   Discussed case with appropriate providers- Dr Erum Singh   Reviewed laboratory and x-ray data from admission to present     Pt resting in bed, no distress noted. Wife, Corbin Wilkerson, at bedside. Pt awakens to voice, and answers questions. Respirations mildly labored with speech, and he endorses mild dyspnea. Pt slept for the most part after the initial introduction. Corbin Wilkerson has very good understanding of pt's condition, and poor prognosis. She reports the frequency of paracenteses has increased significantly over the last year, and he has grown weaker. She feels this is the beginning of the end for pt. Sylvia inquired about hospice support at home, reporting pt wants to pass away at home. Advised CM would assist with arranging hospice at discharge- order placed for CM. Corbin Wilkerson also voiced interest in a Pleurx for pt after his SBP is treated- will defer to GI/Hospitalist.  Discussed with Dr Erum Singh. No further PC needs identified- we will sign off. Thank you for allowing us to participate in Mr Heather Pereira' care.       Will discuss findings with members of the interdisciplinary team.      Thank you for this referral.          .    Subjective:     History obtained from:  Patient, Family and Chart    Chief Complaint: Respiratory failure, SBP  History of Present Illness:  Mr Jose Francisco Rhodes is a 69 yo  male with PMH of CAD, CHF, HTN, GERD, cirrhosis, and other conditions listed below, who presented to the ER on 7/4/2021 with c/o increasing dyspnea for 3 days, with associated dizziness. Pt denied fevers, chest pain, n/v/d. Work up revealed acute hypoxic respiratory failure, acute on chronic CHF, and CAP. Pt was admitted for further management. He has been evaluated by cardiology, pulmonology, and IR. Pt underwent paracentesis on 7/5/2021, with 5400 ml of fluid removed. He underwent thoracentesis today, with 1650 ml of fluid removed from the right. Advance Directive: No       Code Status:  DNR            Health Care Power of : No - Patient does not have a [address]. Past Medical History:   Diagnosis Date    Abnormal echocardiogram 39/73/1441    systolic function 01-13% and Mod-severe AS    Aortic heart murmur 2019    Per pt  told by Dr. Sushma Main ASCVD (arteriosclerotic cardiovascular disease)     calcification on CT scan    BCC (basal cell carcinoma of skin)     Diverticulosis 04/17/2017    CT scan- fatty liver, diverticulosis, umbilical hernia, prostate enlargement, normal appendix    Elevated liver enzymes     history of hepatic steatosis    Elevated PSA, less than 10 ng/ml 2019    GERD (gastroesophageal reflux disease) 06/26/2014    Managed with meds     Hypertension 06/26/2014    Managed with meds     Impotence of organic origin     Liver disease     ascites, per PCP office note dated 7/18/19- Fluid retention is impressive.      Macrocytic anemia     Mixed hyperlipidemia 6/26/2014    Nephrolithiasis     Obesity 5/8/2015    Osteoarthrosis 6/27/2014    Squamous cell carcinoma of skin of chest 2019    Unspecified hypothyroidism 6/26/2014      Past Surgical History:   Procedure Laterality Date    IR PARACENTESIS ABD W IMAGE  12/2/2019    IR PARACENTESIS ABD W IMAGE  12/13/2019    IR PARACENTESIS ABD W IMAGE  1/31/2020    IR PARACENTESIS ABD W IMAGE  3/13/2020    IR PARACENTESIS ABD W IMAGE  4/17/2020    IR PARACENTESIS ABD W IMAGE  6/18/2020    IR PARACENTESIS ABD W IMAGE  3/26/2021    IR PARACENTESIS ABD W IMAGE  4/30/2021    IR PARACENTESIS ABD W IMAGE  5/27/2021     Family History   Problem Relation Age of Onset    Cancer Father         pancreatic      Social History     Tobacco Use    Smoking status: Never Smoker    Smokeless tobacco: Never Used   Substance Use Topics    Alcohol use: Yes     Alcohol/week: 14.0 standard drinks     Types: 14 Glasses of wine per week     Prior to Admission medications    Medication Sig Start Date End Date Taking? Authorizing Provider   omeprazole (PRILOSEC) 20 mg capsule Take 1 capsule by mouth once daily 4/21/21  Yes Lucie Bosworth, MD   tamsulosin (Flomax) 0.4 mg capsule Take 1 Cap by mouth daily. Indications: enlarged prostate with urination problem, stones in the urinary tract 4/21/21  Yes Lucie Bosworth, MD   levothyroxine (SYNTHROID) 75 mcg tablet Take 1 Tab by mouth Daily (before breakfast). 1/7/21  Yes Lucie Bosworth, MD   furosemide (Lasix) 80 mg tablet Take 1 and 1/2 tablets daily. Patient taking differently: 80 mg daily. Take 1 tablet daily. 1/6/21  Yes Lucie Bosworth, MD   spironolactone (ALDACTONE) 50 mg tablet Take 1 Tab by mouth daily. Patient taking differently: Take 25 mg by mouth daily. 1/6/21  Yes Lucie Bosworth, MD   FLUoxetine (PROzac) 20 mg capsule Take 1 Cap by mouth daily. 8/24/20  Yes Lucie Bosworth, MD   clotrimazole-betamethasone (LOTRISONE) topical cream Apply  to affected area two (2) times a day. Patient taking differently: Apply  to affected area two (2) times daily as needed.  5/26/17   Lucie Bosworth, MD       No Known Allergies     Review of Systems:  A comprehensive review of systems was negative except for:   Constitutional: Positive for fatigue. Respiratory: Positive for dyspnea   Gastrointestinal: Positive for abdominal distention      Objective:     Visit Vitals  /84   Pulse 99   Temp (!) 96.3 °F (35.7 °C)   Resp 22   Ht 5' 11\" (1.803 m)   Wt 218 lb 14.7 oz (99.3 kg)   SpO2 97%   BMI 30.53 kg/m²        Physical Exam:    General:  Sleepy. Debilitated. No acute distress. Eyes:  Conjunctivae/corneas clear    Nose: Nares normal. Septum midline. Neck: Supple, symmetrical, trachea midline   Lungs:   Decreased bilaterally, mildly labored with talking   Heart:  Regular rate and rhythm   Abdomen:   Soft, non-tender, non-distended   Extremities: Normal, atraumatic, no cyanosis.  BLE edema   Skin: Skin color, texture, turgor normal.    Neurologic: Nonfocal   Psych: Alert and oriented      Assessment:     Hospital Problems  Date Reviewed: 5/25/2021        Codes Class Noted POA    Pleural effusion on right ICD-10-CM: J90  ICD-9-CM: 511.9  7/6/2021 Unknown        SBP (spontaneous bacterial peritonitis) ([facility] ICD-10-CM: S19.9  ICD-9-CM: 567.23  7/6/2021 Unknown        Acute on chronic systolic heart failure (HCC) ICD-10-CM: I50.23  ICD-9-CM: 428.23  7/4/2021 Yes        Stage 3 chronic kidney disease ([facility] ICD-10-CM: N18.30  ICD-9-CM: 585.3  7/4/2021 Yes        Portal hypertension ([facility] ICD-10-CM: K76.6  ICD-9-CM: 572.3  7/4/2021 Yes        * (Principal) Acute respiratory failure with hypoxia and hypercapnia (HCC) ICD-10-CM: J96.01, J96.02  ICD-9-CM: 518.81  7/4/2021 Unknown        Anasarca ICD-10-CM: R60.1  ICD-9-CM: 782.3  11/19/2019 Yes        Ascites due to alcoholic cirrhosis ([facility] LKW-11-HM: K70.31  ICD-9-CM: 571.2  10/8/2019 Unknown        Aortic valve stenosis ICD-10-CM: I35.0  ICD-9-CM: 424.1  10/8/2019 Yes              Signed By: Nicholas Mendoza NP     July 6, 2021

## 2021-07-06 NOTE — PROGRESS NOTES
[facility] CARDIOLOGY PROGRESS NOTE           7/6/2021 12:35 PM    Admit Date: 7/4/2021    Subjective:   Patient reports that he feels better today after large volume paracentesis. Abdomen is still sore. Denies chest pain, rotations. Continues to have shortness of breath fatigue. ROS:  Cardiovascular:  As noted above    Objective:      Vitals:    07/06/21 1021 07/06/21 1029 07/06/21 1116 07/06/21 1133   BP: 106/64 113/69  (!) 86/64   Pulse: (!) 105 (!) 102 (!) 102 (!) 102   Resp:       Temp:       SpO2: 96% 97% 97% 96%   Weight:       Height:           Physical Exam:  General-No Acute Distress, awake , interactive, ill-appearing  Neck- supple, no JVD  CV- regular rate and rhythm, 2/6 systolic murmur right upper sternal border  Lung-Rales at the bases bilaterally  Abd- soft, nontender, nondistended  Ext- no edema bilaterally in his legs   skin- warm and dry    Data Review:   Recent Labs     07/06/21  0619 07/05/21  0516 07/04/21  1850 07/04/21  1849    142   < >  --    K 3.6 3.9   < >  --    MG 1.9 2.1   < >  --    BUN 48* 44*   < >  --    CREA 1.84* 1.62*   < >  --    GLU 96 85   < >  --    WBC 6.6 10.9   < >  --    HGB 7.3* 8.0*   < >  --    HCT 24.3* 28.3*   < >  --    * 149*   < >  --    INR  --   --   --  1.2    < > = values in this interval not displayed.        Assessment/Plan:     Principal Problem:    Acute respiratory failure with hypercapnia (HCC) (7/4/2021)  Reasonable to attempt diuresis  Status post thoracentesis  Consider Neville placement  Hemodynamics preserved at this time but blood pressure is low limits ability to titrate heart failure therapy    Active Problems:    Alcoholic cirrhosis of liver with ascites ([facility] [address] (10/8/2019)  Status post paracentesis, fluid cultures pending      Aortic valve stenosis (10/8/2019)  Appears severe on recent echo, diuresis as tolerated      Anasarca (11/19/2019)  Likely combination of underlying liver disease and cardiomyopathy      Acute on chronic systolic heart failure ([facility] [address] (7/4/2021)  On IV diuretics, ports minimal urine output  Creatinine elevated today consider Neville as above      Stage 3 chronic kidney disease ([facility] [address] (7/4/2021)  Per primary      Portal hypertension ([facility] [address] (7/4/2021)  Sequelae of underlying cirrhosis    Unfortunately poor prognosis given severe AS/cardiomyopathy and underlying decompensated cirrhosis, although improved today.   Palliative care asked to see the patient      Gilbert Perry DO  7/6/2021 12:35 PM

## 2021-07-06 NOTE — PROGRESS NOTES
07/05/21 2207   Dual Skin Pressure Injury Assessment   Dual Skin Pressure Injury Assessment X   Second Care Provider (Based on [address] [place] [address]) Narcisa RN   Sacrum  Mid  (blanchable redness)   Skin Integumentary   Skin Integumentary (WDL) X    Pressure  Injury Documentation Pressure Injury Noted-See Wound LDA to Document   Skin Color Ecchymosis (comment); Pale   Skin Condition/Temp Cool   Skin Integrity Abrasion; Excoriation  (abrasion on nose, excoriation on genitals)   Hair Growth Sparce

## 2021-07-06 NOTE — PROGRESS NOTES
AM Note: OT orders received and chart reviewed. Per RN, pt off floor at IR. Will follow up and attempt to see pt as schedule permits and as pt is available to be seen. PM Noted: OT orders received and chart reviewed. Attempted to see pt this PM. Pt kindly declined working with therapy due to increased fatigue from being off floor earlier in day. Pt reported that he would be willing to work with therapy tomorrow. Will follow up and attempt to see pt as schedule permits and as pt is available to be seen.        Thank you,     Tamiko Tan OTR/L

## 2021-07-06 NOTE — PROGRESS NOTES
TRANSFER - OUT REPORT:    Verbal report given to Meche Corey RN(name) on Marcus Malave  being transferred to [facility](unit) for routine progression of care       Report consisted of patients Situation, Background, Assessment and   Recommendations(SBAR). Information from the following report(s) SBAR, Kardex, Intake/Output, MAR, Recent Results, Med Rec Status, Cardiac Rhythm SR, Alarm Parameters  and Dual Neuro Assessment was reviewed with the receiving nurse. Lines:   Peripheral IV 07/04/21 Left Antecubital (Active)   Site Assessment Clean, dry, & intact 07/05/21 1917   Phlebitis Assessment 0 07/05/21 1917   Infiltration Assessment 0 07/05/21 1917   Dressing Status Clean, dry, & intact 07/05/21 1917   Dressing Type Transparent;Tape 07/05/21 1917   Hub Color/Line Status Pink; Infusing;Patent 07/05/21 1917   Alcohol Cap Used No 07/05/21 1917       Peripheral IV 07/04/21 (Active)   Site Assessment Clean, dry, & intact 07/05/21 1917   Phlebitis Assessment 0 07/05/21 1917   Infiltration Assessment 0 07/05/21 1917   Dressing Status Clean, dry, & intact 07/05/21 1917   Dressing Type Transparent;Tape 07/05/21 1917   Hub Color/Line Status Blue; Infusing;Patent 07/05/21 1917   Alcohol Cap Used No 07/05/21 1917        Opportunity for questions and clarification was provided.       Patient transported with:   O2 @ 2 liters  Registered Nurse

## 2021-07-06 NOTE — CONSULTS
[facility] Consult Note       Primary GI Physician: Dr Rosario Gordon    Referring Provider:  Dr Dimitri Mejia    Consult Date:  7/6/2021    Admit Date:  7/4/2021    Chief Complaint:  SBP, fatty liver    Subjective:     History of Present Illness:  Patient is a 70 y.o. male with PMH of etoh-related fatty liver, portal htn and cirrhosis, ASCVD, heart failure, aortic stenosis, anemia, hyperlipidemia, thyroid disease, seen in consultation at the request of Dr. Dimitri Mejia for evaluation for SBP. Patient was admitted by the hospitalist service 7/4 after presenting to the ED for shortness of breath, worsening 3 days prior to admission. He was felt to have acute on chronic heart failure with diuretics increased. He initially required ICU admission with Levophed. Chest x-ray with bilateral infiltrate found and he was placed on Ceftriaxone and Azithromycin. Because of abdominal distention, paracentesis with albumin infusion was ordered performed 7/5 at bedside with 5400 ml removed and fluid studies sent. Paracentesis fluid cloudy with culture and gram stain pending, WBC 5255, neutrophil 96. Repeat para has been ordered for today, along with thoracentesis. Patient has now returned to room and reports one liter was removed from his lungs but para was not repeated. Labs on admission with normal WBC, hgb 8.9, platelet 119, INR 1.2, creatinine 1.68, alb 2.5, ALT 14, AST 21, and alk phos 197. Lactic acid was normal with elevated pro--BNP >35,000 and hs-troponin 192.6. TSH elevated at 8.4. Patient is followed by Dr Rosario Gordon for liver disease, with most recent paracentesis was 5/27/21 (5L removed at that time). He has been undergoing paracentesis approximately every once a month since the spring. He has been on Lasix 80 mg daily and Aldactone 25 mg daily as an outpatient. Repeat EGD has been recommended for variceal surveillance but not pursued by the patient.  He denies issues with confusion or HE in the past.        EGD 12/10/19  FINDINGS:   OROPHARYNX: Cords and pyriform recesses normal.  ESOPHAGUS: The esophagus is normal. The proximal, mid and distal portions are normal. The Z-Line is unremarkable. No varices noted. STOMACH: The fundus on antegrade and retroflex views is normal. The body, antrum and pylorus are normal.    DUODENUM: The bulb and second portions are normal.   Estimated blood loss: 0-minimal    PLAN:  1. Repeat scope in 3 years or sooner if decompensates  2. Check BMP  3. Outpatient paracentesis.   Peg Neville MD      PMH:  Past Medical History:   Diagnosis Date    Abnormal echocardiogram 11/59/0846    systolic function 50-67% and Mod-severe AS    Aortic heart murmur 2019    Per pt  told by Dr. Silas Em ASCVD (arteriosclerotic cardiovascular disease)     calcification on CT scan    BCC (basal cell carcinoma of skin)     Diverticulosis 04/17/2017    CT scan- fatty liver, diverticulosis, umbilical hernia, prostate enlargement, normal appendix    Elevated liver enzymes     history of hepatic steatosis    Elevated PSA, less than 10 ng/ml 2019    GERD (gastroesophageal reflux disease) 06/26/2014    Managed with meds     Hypertension 06/26/2014    Managed with meds     Impotence of organic origin     Liver disease     ascites, per PCP office note dated 7/18/19- Fluid retention is impressive.  Macrocytic anemia     Mixed hyperlipidemia 6/26/2014    Nephrolithiasis     Obesity 5/8/2015    Osteoarthrosis 6/27/2014    Squamous cell carcinoma of skin of chest 2019    Unspecified hypothyroidism 6/26/2014       PSH:  Past Surgical History:   Procedure Laterality Date    IR PARACENTESIS ABD W IMAGE  12/2/2019      12/13/2019      1/31/2020      3/13/2020      4/17/2020      6/18/2020      3/26/2021      4/30/2021      5/27/2021       Allergies:  No Known Allergies    Home Medications:  Prior to Admission medications    Medication Sig Start Date End Date Taking?  Authorizing Provider omeprazole (PRILOSEC) 20 mg capsule Take 1 capsule by mouth once daily 4/21/21  Yes Sylvia Nyhan, MD   tamsulosin (Flomax) 0.4 mg capsule Take 1 Cap by mouth daily. Indications: enlarged prostate with urination problem, stones in the urinary tract 4/21/21  Yes Sylvia Nyhan, MD   levothyroxine (SYNTHROID) 75 mcg tablet Take 1 Tab by mouth Daily (before breakfast). 1/7/21  Yes Sylvia Nyhan, MD   furosemide (Lasix) 80 mg tablet Take 1 and 1/2 tablets daily. Patient taking differently: 80 mg daily. Take 1 tablet daily. 1/6/21  Yes Sylvia Nyhan, MD   spironolactone (ALDACTONE) 50 mg tablet Take 1 Tab by mouth daily. Patient taking differently: Take 25 mg by mouth daily. 1/6/21  Yes Sylvia Nyhan, MD   FLUoxetine (PROzac) 20 mg capsule Take 1 Cap by mouth daily. 8/24/20  Yes Sylvia Nyhan, MD   clotrimazole-betamethasone (LOTRISONE) topical cream Apply  to affected area two (2) times a day. Patient taking differently: Apply  to affected area two (2) times daily as needed.  5/26/17   Sylvia Nyhan, MD       Hospital Medications:  Current Facility-Administered Medications   Medication Dose Route Frequency    midodrine (PROAMATINE) tablet 10 mg  10 mg Oral TID WITH MEALS    albumin human 25% (BUMINATE) solution 25 g  25 g IntraVENous Q6H    bumetanide (BUMEX) 0.25 mg/mL infusion  1 mg/hr IntraVENous CONTINUOUS    ondansetron (ZOFRAN) injection 4 mg  4 mg IntraVENous Q4H PRN    traMADoL (ULTRAM) tablet 50 mg  50 mg Oral Q6H PRN    cefTRIAXone (ROCEPHIN) 1 g in 0.9% sodium chloride (MBP/ADV) 50 mL MBP  1 g IntraVENous Q24H    azithromycin (ZITHROMAX) 500 mg in 0.9% sodium chloride 250 mL (VIAL-MATE)  500 mg IntraVENous Q24H    thiamine HCL (B-1) tablet 100 mg  100 mg Oral DAILY    FLUoxetine (PROzac) capsule 20 mg  20 mg Oral DAILY    levothyroxine (SYNTHROID) tablet 75 mcg  75 mcg Oral ACB    pantoprazole (PROTONIX) tablet 40 mg  40 mg Oral ACB    tamsulosin (FLOMAX) capsule 0.4 mg  0.4 mg Oral DAILY    sodium chloride (NS) flush 5-40 mL  5-40 mL IntraVENous Q8H    sodium chloride (NS) flush 5-40 mL  5-40 mL IntraVENous PRN    senna (SENOKOT) tablet 8.6 mg  1 Tablet Oral DAILY PRN    [Held by provider] heparin (porcine) injection 5,000 Units  5,000 Units SubCUTAneous Q8H    hydrALAZINE (APRESOLINE) 20 mg/mL injection 10 mg  10 mg IntraVENous Q6H PRN    NOREPINephrine (LEVOPHED) 4 mg in 5% dextrose 250 mL infusion  0.5-16 mcg/min IntraVENous TITRATE       Social History:  Social History     Tobacco Use    Smoking status: Never Smoker    Smokeless tobacco: Never Used   Substance Use Topics    Alcohol use: Yes     Alcohol/week: 14.0 standard drinks     Types: 14 Glasses of wine per week         Family History:  Family History   Problem Relation Age of Onset    Cancer Father         pancreatic       Review of Systems:  A detailed 10 system ROS is obtained, with pertinent positives as listed above. All others are negative. Diet:  Low sodium    Objective:     Physical Exam:  Vitals:  Visit Vitals  BP 94/73 (BP 1 Location: Right upper arm, BP Patient Position: At rest)   Pulse 100   Temp 97.6 °F (36.4 °C)   Resp 20   Ht 5' 11\" (1.803 m)   Wt 99.3 kg (218 lb 14.7 oz)   SpO2 97%   BMI 30.53 kg/m²     Gen:  Pt is alert, cooperative, no acute distress  Skin:  Extremities and face reveal no rashes. HEENT: Sclerae anicteric. Extra-occular muscles are intact. No oral ulcers. No abnormal pigmentation of the lips. The neck is supple. Cardiovascular: Regular rate and rhythm. No murmurs, gallops, or rubs. Respiratory:  Comfortable breathing with no accessory muscle use. Clear breath sounds anteriorly with no wheezes, rales, or rhonchi. GI:  Abdomen soft, full and mildly and diffusely tender. Normal active bowel sounds. No enlargement of the liver or spleen. No masses palpable. Rectal:  Deferred  Musculoskeletal:  No pitting edema of the lower legs. Neurological:  Gross memory appears intact. Patient is alert and oriented. Psychiatric:  Mood appears appropriate with judgement intact. Lymphatic:  No cervical or supraclavicular adenopathy. Laboratory:    Recent Labs     07/06/21  0619 07/05/21  0516 07/04/21  1850 07/04/21  1849   WBC 6.6 10.9 9.5  --    HGB 7.3* 8.0* 8.9*  --    HCT 24.3* 28.3* 30.0*  --    * 149* 193  --    MCV 91.7 96.3 92.9  --     142 141  --    K 3.6 3.9 3.9  --     106 105  --    CO2 32 28 29  --    BUN 48* 44* 44*  --    CREA 1.84* 1.62* 1.68*  --    CA 8.8 8.7 8.9  --    MG 1.9 2.1 1.7*  --    GLU 96 85 97  --    AP  --  182* 197*  --    AST  --  16 21  --    ALT  --  12 14  --    TBILI  --  0.5 0.6  --    ALB  --  2.2* 2.5*  --    TP  --  7.0 7.6  --    PTP  --   --   --  15.0*   INR  --   --   --  1.2          Assessment:     Principal Problem:    Acute respiratory failure with hypercapnia (HCC) (7/4/2021)    Active Problems:    Alcoholic cirrhosis of liver with ascites (HCC) (10/8/2019)      Aortic valve stenosis (10/8/2019)      Anasarca (11/19/2019)      Acute on chronic systolic heart failure (HCC) (7/4/2021)      Stage 3 chronic kidney disease ([facility] [address] (7/4/2021)      Portal hypertension (HCC) (7/4/2021)        Plan:     69 yo male with hx of etoh cirrhosis is seen in consultation for SBP. He was admitted 7/4 with SOB and felt to be in acute on chronic heart failure, initially admitted to the ICU on Levophed. He was placed on dual antibx coverage for bilateral infiltrates. He had bedside paracentesis 7/5 with 5L removed and studies consistent with SBP. He had 1L removed today with thoracentesis. He has been undergoing recurrent paracentesis approximately once a month for the last 4 months and has been unable to increase diuretic therapy secondary to renal function. 1.  Continue antibiotics - now on Rocephin and Azithromycin. He will need antibx prophylaxis at discharge  2.   Unable to increase diuretics secondary to worsening renal function; he may be a candidate for TIPS after infection resolved if cardiac status allows (echo 5/2021 with EF at 15-20%)  3. Due for EGD for variceal surveillance (none on prior exam in 2019) - will discuss    Patient is seen and examined in collaboration with Dr. Marina Zamarripa. Assessment and plan as per Dr. Marina Zamarripa.   Keri Mayorga NP

## 2021-07-06 NOTE — PROCEDURES
Department of Interventional Radiology  [phone]        Interventional Radiology Brief Procedure Note    Patient: Daron Lorenzo MRN: [id number]  SSN: [id number]    YOB: 1950  Age: 70 y.o.   Sex: male      Date of Procedure: 7/6/2021    Pre-Procedure Diagnosis: CHF, alcoholic cirrhosis, dyspnea, bilateral pleural effusions    Post-Procedure Diagnosis: SAME    Procedure(s): Thoracentesis    Brief Description of Procedure: US guided right thoracentesis, 1650 ml thin yellow fluid removed    Performed By: Jesica Azar PA-C     Assistants: None    Anesthesia:Lidocaine    Estimated Blood Loss: None    Specimens:  pleural fluid    Implants:  None    Findings: scant perihepatic ascites, anasarca, bilateral pleural effusions R>L    Complications: None    Recommendations: routine wound care     Follow Up: prn    Signed By: Jesica Azar PA-C     July 6, 2021

## 2021-07-06 NOTE — PROGRESS NOTES
TRANSFER - OUT REPORT:    Verbal report given to Apple(name) on Fauzia Silverman  being transferred to [facility](unit) for routine progression of care       Report consisted of patients Situation, Background, Assessment and   Recommendations(SBAR). Information from the following report(s) SBAR, Procedure Summary and Intake/Output was reviewed with the receiving nurse. Lines:   Peripheral IV 07/04/21 Left Antecubital (Active)   Site Assessment Clean, dry, & intact 07/05/21 2207   Phlebitis Assessment 0 07/05/21 2207   Infiltration Assessment 0 07/05/21 2207   Dressing Status Clean, dry, & intact 07/05/21 2207   Dressing Type Tape;Transparent 07/05/21 2207   Hub Color/Line Status Infusing 07/05/21 2207   Alcohol Cap Used No 07/05/21 2207       Peripheral IV 07/04/21 (Active)   Site Assessment Clean, dry, & intact 07/05/21 2207   Phlebitis Assessment 0 07/05/21 2207   Infiltration Assessment 0 07/05/21 2207   Dressing Status Clean, dry, & intact 07/05/21 2207   Dressing Type Tape;Transparent 07/05/21 2207   Hub Color/Line Status Flushed 07/05/21 2207   Alcohol Cap Used No 07/05/21 2207        Opportunity for questions and clarification was provided.       Patient transported with:   O2 @ 3 liters

## 2021-07-06 NOTE — PROGRESS NOTES
1650 ml pleural fluid obtained from right thoracentesis. Sample sent to lab. Clear pale yellow fluid. Manual pressure applied to puncture site. No s\s of bleeding.

## 2021-07-06 NOTE — PROGRESS NOTES
Chelsea Rom  Admission Date: 7/4/2021             Daily Progress Note: 7/6/2021    The patient's chart is reviewed and the patient is discussed with the staff. 70 y.o.  male seen and evaluated at the request of Dr. Jesus Palomares.     From   Mr. Navarro Strong is a 69 y/o WM with a h/o severe AS, chronic sCHF (LVEF 15-20% May 2021), EtOH cirrhosis with portal HTN, hypothyroidism who was admitted to our service on 7/4 with acute hypoxemic respiratory failure 2/2 acute on chronic sCHF exacerbation. Started on IV Lasix, Bipap and empiric antibiotics and admitted to ICU. Cardiology consulted. Initially started on low dose Levophed for hypotension, though was weaned off rather quickly. He decompensated after admission and equid NIPPV and this consultation. He received 8-0 mg of lasix IV with virtually no response.     He is much more comfortable but has a large ascites that is impinging on respiratory mechanics    Subjective:      On 3L O2 NC  US guided paracentesis done yesterday, 5400 mL removed, right US guided thoracentesis done in IR today, 1650 mL removed  Has been seen by Palliative Care, interested in hospice at discharge      Current Facility-Administered Medications   Medication Dose Route Frequency    midodrine (PROAMATINE) tablet 10 mg  10 mg Oral TID WITH MEALS    acetaminophen (TYLENOL) tablet 650 mg  650 mg Oral Q6H PRN    albumin human 25% (BUMINATE) solution 25 g  25 g IntraVENous Q6H    bumetanide (BUMEX) 0.25 mg/mL infusion  1 mg/hr IntraVENous CONTINUOUS    ondansetron (ZOFRAN) injection 4 mg  4 mg IntraVENous Q4H PRN    traMADoL (ULTRAM) tablet 50 mg  50 mg Oral Q6H PRN    cefTRIAXone (ROCEPHIN) 1 g in 0.9% sodium chloride (MBP/ADV) 50 mL MBP  1 g IntraVENous Q24H    azithromycin (ZITHROMAX) 500 mg in 0.9% sodium chloride 250 mL (VIAL-MATE)  500 mg IntraVENous Q24H    thiamine HCL (B-1) tablet 100 mg  100 mg Oral DAILY    FLUoxetine (PROzac) capsule 20 mg 20 mg Oral DAILY    levothyroxine (SYNTHROID) tablet 75 mcg  75 mcg Oral ACB    pantoprazole (PROTONIX) tablet 40 mg  40 mg Oral ACB    tamsulosin (FLOMAX) capsule 0.4 mg  0.4 mg Oral DAILY    sodium chloride (NS) flush 5-40 mL  5-40 mL IntraVENous Q8H    sodium chloride (NS) flush 5-40 mL  5-40 mL IntraVENous PRN    senna (SENOKOT) tablet 8.6 mg  1 Tablet Oral DAILY PRN    [Held by provider] heparin (porcine) injection 5,000 Units  5,000 Units SubCUTAneous Q8H    hydrALAZINE (APRESOLINE) 20 mg/mL injection 10 mg  10 mg IntraVENous Q6H PRN    NOREPINephrine (LEVOPHED) 4 mg in 5% dextrose 250 mL infusion  0.5-16 mcg/min IntraVENous TITRATE       Review of Systems    Constitutional: negative for fever, chills, sweats  Cardiovascular: negative for chest pain, palpitations, syncope, edema  Gastrointestinal:  negative for dysphagia, reflux, vomiting, diarrhea, abdominal pain, or melena  Neurologic:  negative for focal weakness, numbness, headache    Objective:     Vitals:    07/06/21 1116 07/06/21 1133 07/06/21 1230 07/06/21 1307   BP:  (!) 86/64 91/67 102/84   Pulse: (!) 102 (!) 102 97 99   Resp:   20 22   Temp:   97.5 °F (36.4 °C) (!) 96.3 °F (35.7 °C)   SpO2: 97% 96% 97% 97%   Weight:       Height:             Intake/Output Summary (Last 24 hours) at 7/6/2021 1351  Last data filed at 7/6/2021 1133  Gross per 24 hour   Intake 903.83 ml   Output 2475 ml   Net -1571.17 ml       Physical Exam:   Constitution:  the patient is well developed and in no acute distress  EENMT:  Sclera clear, pupils equal, oral mucosa moist  Respiratory: On 3L O2 NC, no wheezing  Cardiovascular:  RRR without M,G,R  Gastrointestinal: soft and non-tender; with positive bowel sounds. Musculoskeletal: warm without cyanosis. There is trace lower extremity edema.   Skin:  no jaundice or rashes, no wounds   Neurologic: no gross neuro deficits     Psychiatric:  alert and oriented, lethargic    CXR:  7/4/21    IMPRESSION:  Bibasilar pleural effusions and airspace disease reflecting atelectasis or  Infiltrates. LAB  No results for input(s): GLUCPOC in the last 72 hours.     No lab exists for component: [place]   Recent Labs     07/06/21 0619 07/05/21 0516 07/04/21  1850 07/04/21  1849   WBC 6.6 10.9 9.5  --    HGB 7.3* 8.0* 8.9*  --    HCT 24.3* 28.3* 30.0*  --    * 149* 193  --    INR  --   --   --  1.2     Recent Labs     07/06/21 0619 07/05/21 0516 07/04/21  1850    142 141   K 3.6 3.9 3.9    106 105   CO2 32 28 29   GLU 96 85 97   BUN 48* 44* 44*   CREA 1.84* 1.62* 1.68*   MG 1.9 2.1 1.7*   CA 8.8 8.7 8.9   ALB  --  2.2* 2.5*   TBILI  --  0.5 0.6   ALT  --  12 14     Recent Labs     07/04/21  2300   PHI 7.32*   PCO2I 57.5*   PO2I 127*   HCO3I 29.9*     Recent Labs     07/05/21 0516 07/04/21  2321   LAC 1.8 2.0         Assessment:  (Medical Decision Making)     Hospital Problems  Date Reviewed: 5/25/2021        Codes Class Noted POA    Pleural effusion on right ICD-10-CM: J90  ICD-9-CM: 511.9  7/6/2021 Unknown        SBP (spontaneous bacterial peritonitis) ([facility] ICD-10-CM: K65.2  ICD-9-CM: 567.23  7/6/2021 Unknown        Acute on chronic systolic heart failure (HCC) ICD-10-CM: I50.23  ICD-9-CM: 428.23  7/4/2021 Yes        Stage 3 chronic kidney disease ([facility] ICD-10-CM: N18.30  ICD-9-CM: 585.3  7/4/2021 Yes        Portal hypertension ([facility] ICD-10-CM: K76.6  ICD-9-CM: 572.3  7/4/2021 Yes        * (Principal) Acute respiratory failure with hypoxia and hypercapnia (HCC) ICD-10-CM: J96.01, J96.02  ICD-9-CM: 518.81  7/4/2021 Unknown        Anasarca ICD-10-CM: R60.1  ICD-9-CM: 782.3  11/19/2019 Yes        Ascites due to alcoholic cirrhosis (HCC) FSK-40-DE: K70.31  ICD-9-CM: 571.2  10/8/2019 Unknown        Aortic valve stenosis ICD-10-CM: I35.0  ICD-9-CM: 424.1  10/8/2019 Yes              Plan:  (Medical Decision Making)     --Patient on 3L O2 NC, wean as tolerated  --S/P right US guided thoracentesis, 1650 ml removed today  --Palliative Care has signed off, family would like to pursue hospice at discharge  --Patient is a DNR    More than 50% of the time documented was spent in face-to-face contact with the patient and in the care of the patient on the floor/unit where the patient is located. Jenifer Juárez NP    The patient has been seen and examined by me personally, the chart,labs, and radiographic studies have been reviewed. Chest:CTA  Extremities: 2+ edema    I agree with the above assessment and plan. Nothing to add, will see as needed and sign off for now.     Sergio Delgado MD.

## 2021-07-06 NOTE — PROGRESS NOTES
Physical Therapy Note:    Pt chart reviewed and evaluation attempted. He is currently off the floor in IR. PT will attempt at a later time as schedule allows and pt is able. PM: PT/OT attempted evaluation, pt politely declines stating he is very tired after this morning. Pt and wife request attempt in the morning tomorrow. PT will attempt at a later time.     Thank you,  Stephan Sanchez, PT, DPT

## 2021-07-06 NOTE — PROGRESS NOTES
Pt was transferred from ICU to room 518 on 7-5-21 for progression of care. CM spoke with pt's spouse Justin Barnett [phone] to discuss d/c planning. Demographics verified. Pt resides with his spouse in a one-story house with 3 steps to enter. Pt has the following home DME: rolling walker. Prior to hospitalization pt was fairly independent with ADLs but needed assistance with ambulating or standing up out of a chair r/t \"become weaker. \"  Pt has insurance without a pharmacy plan. He pays OOP for all of his meds at [facility] per his choice. Spouse is the sole caregiver and works full-time. She inquired about hospice services but \"does not want to have hospice if pt isn't ready yet. \"  CM explained that PT and OT will evaluate pt, the practitioners will evaluate pt's progress, and this will assist CM with a determining the safest d/c plan. Spouse voiced appreciation. Pt admitted with Acute respiratory failure with hypoxia, Ascites due to alcoholic cirrhosis, Acute on chronic heart failure, Portal HTN, CAP, CKD-3, Hypothyroidism, Anasarca, and Aortic valve stenosis. On 7-5-21 pt had a US guided therapeutic paracentesis. No immediate needs identified. CM will continue to follow and remain available if any needs arise. Care Management Interventions  PCP Verified by CM: Yes Juan Carlos Thayer MD)  Mode of Transport at Discharge:  Other (see comment)  Transition of Care Consult (CM Consult): Discharge Planning, Home Hospice  Discharge Durable Medical Equipment:  (walker)  Physical Therapy Consult: Yes  Occupational Therapy Consult: Yes  Speech Therapy Consult: No  Current Support Network: Lives with Spouse, Own Home  Confirm Follow Up Transport: Family  The Patient and/or Patient Representative was Provided with a Choice of Provider and Agrees with the Discharge Plan?: Yes  Name of the Patient Representative Who was Provided with a Choice of Provider and Agrees with the Discharge Plan: Nguyen Marie Marleni Soto  [place] of Choice List was Provided with Basic Dialogue that Supports the Patient's Individualized Plan of Care/Goals, Treatment Preferences and Shares the Quality Data Associated with the Providers?: Yes   Resource Information Provided?: No  Discharge Location  Discharge Placement: Home with hospice

## 2021-07-06 NOTE — PROGRESS NOTES
Hospitalist Progress Note     Admit Date:  2021  6:45 PM   Name:  Penny Peterson   Age:  70 y.o.  :  1950   MRN:  [id number]   PCP:  Radha Reynolds MD  Presenting Complaint: Shortness of Breath and Dizziness    Initial Admission Diagnosis: Acute respiratory failure with hypoxia ([facility] [J96.01]     Assessment and Plan:     Hospital Problems as of 2021 Date Reviewed: 2021        Codes Class Noted - Resolved POA    Pleural effusion on right ICD-10-CM: J90  ICD-9-CM: 511.9  2021 - Present Unknown        SBP (spontaneous bacterial peritonitis) ([facility] ICD-10-CM: K65.2  ICD-9-CM: 567.23  2021 - Present Unknown        Acute on chronic systolic heart failure ([facility] ICD-10-CM: I50.23  ICD-9-CM: 428.23  2021 - Present Yes        Stage 3 chronic kidney disease ([facility] ICD-10-CM: N18.30  ICD-9-CM: 585.3  2021 - Present Yes        Portal hypertension ([facility] ICD-10-CM: K76.6  ICD-9-CM: 572.3  2021 - Present Yes        * (Principal) Acute respiratory failure with hypoxia and hypercapnia ([facility] ICD-10-CM: J96.01, J96.02  ICD-9-CM: 518.81  2021 - Present Unknown        Anasarca ICD-10-CM: R60.1  ICD-9-CM: 782.3  2019 - Present Yes        Ascites due to alcoholic cirrhosis ([facility] [id number]-[id number]: K70.31  ICD-9-CM: 571.2  10/8/2019 - Present Unknown        Aortic valve stenosis ICD-10-CM: I35.0  ICD-9-CM: 424.1  10/8/2019 - Present Yes              Plan:  # Acute hypercapneic/hypoxic respiratory failure 2/2 AoC sCHF, decompensated cirrhosis, anasarca, ascites, pleural effusion/hepatic hydrothorax   - Stable on 3L NC, cont wean as tolerated. - Con't IV diuresis with bumex drip due to labile BP's. - Albumin added to assist with diuresis   - cont Abx Rocephin for SBP, Azithro for possible CAP   - Cardiology following.  Refused Neville in ER and again today, unclear urine output but I/O's reports net -5L.    - IR performed thoracentesis today with 1650 mL yellow fluid removed, fluid studies ordered    # Hypotension   - start midodrine TID   - Albumin added yesterday     # Cirrhosis  #SBP   - Therapeutic para yesterday with WBC 5,255 and 96% neutrophils, no report of PMNs, Glucose<1, Ascitic Cx's NGTD   - cont on Rocephin IV   - cont IV diuresis   - Consult GI and Palliative Care today     # Bilateral infiltrates on CXR   - Likely fluid, con't diuresis, rapid COVID neg    # CKD   - worsened today. Monitor with diuresis. # Hypothyroidism   - Synthroid    Other listed chronic conditions stable but add to medical complexity; continue current management. Discharge planning: Home with Hospice in 1-2 days. Chronically ill, high risk of decompensation. DNR. Diet:  ADULT DIET Regular; Low Fat/Low Chol/High Fiber/LARA; Low Sodium (2 gm); 60 to 80 gm  DVT ppx: SCDs    Hospital Course:   Mr. Faby East is a 71 y/o WM with a h/o severe AS, chronic sCHF (LVEF 15-20% May 2021), EtOH cirrhosis with portal HTN, hypothyroidism who was admitted to our service on 7/4 with acute hypoxemic respiratory failure 2/2 acute on chronic sCHF exacerbation. Started on IV Lasix, Bipap and empiric antibiotics and admitted to ICU. Cardiology consulted. Initially started on low dose Levophed for hypotension, though was weaned off rather quickly. 24hr Events/Subjective:   7/6: Pt lying upright In bed, states that his breathing is improved from yesterday, now on 3L NC after being weaned off BiPAP yesterday. He did have large volume paracentesis performed by ICU physician yesterday. Patient does admit to significant abdominal pain, which she states is from his paracentesis yesterday. Still has some lower extremity edema, although states that this is improved from yesterday. Denies any fevers, chills, chest pain or pressure, dysuria. He continues to refuse Neville placement although I stressed the importance of this to him.   Patient states that he does not want Neville due to traumatic Neville catheterization in the past.  No other complaints  Objective:     Patient Vitals for the past 24 hrs:   Temp Pulse Resp BP SpO2   07/06/21 1230 97.5 °F (36.4 °C) 97 20 91/67 97 %   07/06/21 1133  (!) 102  (!) 86/64 96 %   07/06/21 1116  (!) 102   97 %   07/06/21 1029  (!) 102  113/69 97 %   07/06/21 1021  (!) 105  106/64 96 %   07/06/21 1004 97.6 °F (36.4 °C) (!) 104 20 106/64 97 %   07/06/21 0752 97.6 °F (36.4 °C) 100 20 94/73 97 %   07/06/21 0252 97.8 °F (36.6 °C) 97 18 98/72 97 %   07/06/21 0116     95 %   07/05/21 2252 98 °F (36.7 °C) (!) 101 17 101/77 98 %   07/05/21 2133  98 16 96/65 99 %   07/05/21 2130  (!) 102 15  91 %   07/05/21 2101  98 20 (!) 87/62 99 %   07/05/21 2100  98 15  98 %   07/05/21 2031  97 17 (!) 86/63 99 %   07/05/21 2030  98 14  99 %   07/05/21 2001  97 17 90/60 99 %   07/05/21 2000  97 15  99 %   07/05/21 1931  98 (!) 33 93/66 98 %   07/05/21 1930  100 21  96 %   07/05/21 1917 97.7 °F (36.5 °C) (!) 107 14 91/62 99 %   07/05/21 1901  95 22 (!) 89/63 99 %   07/05/21 1900  95 17  99 %   07/05/21 1831  97 25 (!) 89/61 90 %   07/05/21 1817  96 25 (!) 90/53 92 %   07/05/21 1801  97 24 91/65 94 %   07/05/21 1746  100 25 94/65 91 %   07/05/21 1734  (!) 104 23 91/63 91 %   07/05/21 1717  94 27 96/68 93 %   07/05/21 1701  95 18 94/70 93 %   07/05/21 1646  (!) 108 16 102/63 91 %   07/05/21 1631  92 17 (!) 97/59 92 %   07/05/21 1617  93 16 (!) 89/61 93 %   07/05/21 1601  92 15 91/60 92 %   07/05/21 1547  93 18 (!) 95/58 96 %   07/05/21 1531  93 19 99/81 95 %   07/05/21 1518  (!) 108 25 96/73 96 %   07/05/21 1501 97.1 °F (36.2 °C) 92 19 101/73 98 %   07/05/21 1446  90 15 99/65 96 %   07/05/21 1431  92 23 91/65 (!) 88 %   07/05/21 1416  92 19 97/69 96 %   07/05/21 1401  97 15 98/69 100 %   07/05/21 1346 97.1 °F (36.2 °C) 94 18 99/72 99 %     Oxygen Therapy  O2 Sat (%): 97 % (07/06/21 1230)  Pulse via Oximetry: 102 beats per minute (07/06/21 1133)  O2 Device: Nasal cannula (07/06/21 1054)  Skin Assessment: Skin breakdown present (see comment/note) (07/05/21 2207)  Skin Protection for O2 Device: No (07/05/21 1831)  O2 Flow Rate (L/min): 3 l/min (07/06/21 1054)  FIO2 (%): 30 % (07/05/21 0734)    Estimated body mass index is 30.53 kg/m² as calculated from the following:    Height as of this encounter: 5' 11\" (1.803 m). Weight as of this encounter: 99.3 kg (218 lb 14.7 oz). Intake/Output Summary (Last 24 hours) at 7/6/2021 1313  Last data filed at 7/6/2021 1133  Gross per 24 hour   Intake 903.83 ml   Output 2475 ml   Net -1571.17 ml       *Note that automatically entered I/Os may not be accurate; dependent on patient compliance with collection and accurate  by techs. General:    Chronically ill appearing. Alert and awake in NAD. Slight SOB on 3L NC  CV:   RRR. 3/6 ROBBIN at USB. No r/g. No edema. No JVD  Lungs:   Decreased, clear anteriorly, mildly labored. No wheezing   Abdomen:   Distended, tender to palpation RUQ, no ascites. Extremities: Warm and dry. No cyanosis. 3+ b/l LE pitting edema up to thighs. Skin:     No rashes.   Normal coloration  Neuro:  AAOx3, no asterixis    I have reviewed all labs, meds, and other studies shown below:  Last 24hr Labs:  Recent Results (from the past 24 hour(s))   METABOLIC PANEL, BASIC    Collection Time: 07/06/21  6:19 AM   Result Value Ref Range    Sodium 142 136 - 145 mmol/L    Potassium 3.6 3.5 - 5.1 mmol/L    Chloride 104 98 - 107 mmol/L    CO2 32 21 - 32 mmol/L    Anion gap 6 (L) 7 - 16 mmol/L    Glucose 96 65 - 100 mg/dL    BUN 48 (H) 8 - 23 MG/DL    Creatinine 1.84 (H) 0.8 - 1.5 MG/DL    GFR est AA 47 (L) >60 ml/min/1.73m2    GFR est non-AA 39 (L) >60 ml/min/1.73m2    Calcium 8.8 8.3 - 10.4 MG/DL   MAGNESIUM    Collection Time: 07/06/21  6:19 AM   Result Value Ref Range    Magnesium 1.9 1.8 - 2.4 mg/dL   CBC WITH AUTOMATED DIFF    Collection Time: 07/06/21  6:19 AM   Result Value Ref Range    WBC 6.6 4.3 - 11.1 K/uL    RBC 2.65 (L) 4.23 - 5.6 M/uL    HGB 7.3 (L) 13.6 - 17.2 g/dL    HCT 24.3 (L) 41.1 - 50.3 %    MCV 91.7 79.6 - 97.8 FL    MCH 27.5 26.1 - 32.9 PG    MCHC 30.0 (L) 31.4 - 35.0 g/dL    RDW 17.0 (H) 11.9 - 14.6 %    PLATELET 657 (L) 826 - 450 K/uL    MPV 9.6 9.4 - 12.3 FL    ABSOLUTE NRBC 0.00 0.0 - 0.2 K/uL    DF AUTOMATED      NEUTROPHILS 84 (H) 43 - 78 %    LYMPHOCYTES 9 (L) 13 - 44 %    MONOCYTES 6 4.0 - 12.0 %    EOSINOPHILS 1 0.5 - 7.8 %    BASOPHILS 0 0.0 - 2.0 %    IMMATURE GRANULOCYTES 1 0.0 - 5.0 %    ABS. NEUTROPHILS 5.5 1.7 - 8.2 K/UL    ABS. LYMPHOCYTES 0.6 0.5 - 4.6 K/UL    ABS. MONOCYTES 0.4 0.1 - 1.3 K/UL    ABS. EOSINOPHILS 0.1 0.0 - 0.8 K/UL    ABS. BASOPHILS 0.0 0.0 - 0.2 K/UL    ABS. IMM. GRANS. 0.0 0.0 - 0.5 K/UL       All Micro Results     Procedure Component Value Units Date/Time    CULTURE, BODY FLUID Nirmal Genao [[id number]]     Order Status: Sent Specimen: Body Fluid from Pleural Fluid     AFB CULTURE + SMEAR W/RFLX ID FROM CULTURE [[id number]]     Order Status: Sent     CULTURE, BODY FLUID W August Reap [[id number]] Collected: 07/05/21 0827    Order Status: Completed Specimen:  Body Fluid from Abdominal Fluid Updated: 07/06/21 0904     Special Requests: NO SPECIAL REQUESTS        GRAM STAIN 0 TO 1 WBC'S/OIF      NO DEFINITE ORGANISM SEEN        Culture result: NO GROWTH 1 DAY       CULTURE, BLOOD [[id number]] Collected: 07/04/21 2203    Order Status: Completed Specimen: Blood Updated: 07/06/21 0648     Special Requests: --        RIGHT  HAND       Culture result: NO GROWTH 2 DAYS       CULTURE, BLOOD [[id number]] Collected: 07/04/21 2321    Order Status: Completed Specimen: Blood Updated: 07/06/21 0648     Special Requests: --        RIGHT  HAND       Culture result: NO GROWTH 2 DAYS       AFB CULTURE + SMEAR W/RFLX ID FROM CULTURE [[id number]] Collected: 07/05/21 0827    Order Status: Completed Updated: 07/05/21 0927    COVID-19 RAPID TEST [[id number]] Collected: 07/05/21 0000    Order Status: Completed Specimen: Nasopharyngeal Updated: 07/05/21 0020     Specimen source NASAL        COVID-19 rapid test Not detected        Comment:      The specimen is NEGATIVE for SARS-CoV-2, the novel coronavirus associated with COVID-19. A negative result does not rule out COVID-19. This test has been authorized by the FDA under an Emergency Use Authorization (EUA) for use by authorized laboratories. Fact sheet for Healthcare Providers: Contextorsdate.co.nz  Fact sheet for Patients: ContextorsdaPEVESA.co.nz       Methodology: Isothermal Nucleic Acid Amplification         CULTURE, BODY FLUID Tracy OCONNELL [[id number]]     Order Status: Canceled Specimen:  Body Fluid from Ascitic Fluid           Current Meds:  Current Facility-Administered Medications   Medication Dose Route Frequency    midodrine (PROAMATINE) tablet 10 mg  10 mg Oral TID WITH MEALS    acetaminophen (TYLENOL) tablet 650 mg  650 mg Oral Q6H PRN    albumin human 25% (BUMINATE) solution 25 g  25 g IntraVENous Q6H    bumetanide (BUMEX) 0.25 mg/mL infusion  1 mg/hr IntraVENous CONTINUOUS    ondansetron (ZOFRAN) injection 4 mg  4 mg IntraVENous Q4H PRN    traMADoL (ULTRAM) tablet 50 mg  50 mg Oral Q6H PRN    cefTRIAXone (ROCEPHIN) 1 g in 0.9% sodium chloride (MBP/ADV) 50 mL MBP  1 g IntraVENous Q24H    azithromycin (ZITHROMAX) 500 mg in 0.9% sodium chloride 250 mL (VIAL-MATE)  500 mg IntraVENous Q24H    thiamine HCL (B-1) tablet 100 mg  100 mg Oral DAILY    FLUoxetine (PROzac) capsule 20 mg  20 mg Oral DAILY    levothyroxine (SYNTHROID) tablet 75 mcg  75 mcg Oral ACB    pantoprazole (PROTONIX) tablet 40 mg  40 mg Oral ACB    tamsulosin (FLOMAX) capsule 0.4 mg  0.4 mg Oral DAILY    sodium chloride (NS) flush 5-40 mL  5-40 mL IntraVENous Q8H    sodium chloride (NS) flush 5-40 mL  5-40 mL IntraVENous PRN    senna (SENOKOT) tablet 8.6 mg  1 Tablet Oral DAILY PRN    [Held by provider] heparin (porcine) injection 5,000 Units  5,000 Units SubCUTAneous Q8H    hydrALAZINE (APRESOLINE) 20 mg/mL injection 10 mg  10 mg IntraVENous Q6H PRN    NOREPINephrine (LEVOPHED) 4 mg in 5% dextrose 250 mL infusion  0.5-16 mcg/min IntraVENous TITRATE       Other Studies:    No results found.     Signed:  Usha Concepcion MD

## 2021-07-07 NOTE — PROGRESS NOTES
[facility] Progress Note         Admit Date:  7/4/2021    Today's Date:  7/7/2021    CC:  Cirrhosis, ascites    Subjective:     Patient plans for hospice noted. Does not want further intervention here and is not interested in PleurX catheter. Spouse at bedside. Medications:   Current Facility-Administered Medications   Medication Dose Route Frequency    midodrine (PROAMATINE) tablet 10 mg  10 mg Oral TID WITH MEALS    acetaminophen (TYLENOL) tablet 650 mg  650 mg Oral Q6H PRN    cefTRIAXone (ROCEPHIN) 2 g in 0.9% sodium chloride (MBP/ADV) 50 mL MBP  2 g IntraVENous Q24H    oxyCODONE IR (ROXICODONE) tablet 5 mg  5 mg Oral Q4H PRN    bumetanide (BUMEX) 0.25 mg/mL infusion  1 mg/hr IntraVENous CONTINUOUS    ondansetron (ZOFRAN) injection 4 mg  4 mg IntraVENous Q4H PRN    azithromycin (ZITHROMAX) 500 mg in 0.9% sodium chloride 250 mL (VIAL-MATE)  500 mg IntraVENous Q24H    thiamine HCL (B-1) tablet 100 mg  100 mg Oral DAILY    FLUoxetine (PROzac) capsule 20 mg  20 mg Oral DAILY    levothyroxine (SYNTHROID) tablet 75 mcg  75 mcg Oral ACB    pantoprazole (PROTONIX) tablet 40 mg  40 mg Oral ACB    tamsulosin (FLOMAX) capsule 0.4 mg  0.4 mg Oral DAILY    sodium chloride (NS) flush 5-40 mL  5-40 mL IntraVENous Q8H    sodium chloride (NS) flush 5-40 mL  5-40 mL IntraVENous PRN    senna (SENOKOT) tablet 8.6 mg  1 Tablet Oral DAILY PRN    [Held by provider] heparin (porcine) injection 5,000 Units  5,000 Units SubCUTAneous Q8H    hydrALAZINE (APRESOLINE) 20 mg/mL injection 10 mg  10 mg IntraVENous Q6H PRN    NOREPINephrine (LEVOPHED) 4 mg in 5% dextrose 250 mL infusion  0.5-16 mcg/min IntraVENous TITRATE       Review of Systems:  ROS was obtained, with pertinent positives as listed above. No chest pain or SOB.     Diet:      Objective:   Vitals:  Visit Vitals  BP 95/71 (BP 1 Location: Right upper arm, BP Patient Position: At rest)   Pulse 96   Temp 97.8 °F (36.6 °C)   Resp 20   Ht 5' 11\" (1.803 m)   Wt 95.6 kg (210 lb 12.8 oz)   SpO2 99%   BMI 29.40 kg/m²     Intake/Output:  07/07 0701 - 07/07 1900  In: 238 [P.O.:200; I.V.:38]  Out: 300 [Urine:300]  07/05 1901 - 07/07 0700  In: 1003 [P.O.:360; I.V.:643]  Out: 2975 [Urine:1325]  Exam:  General appearance: alert, cooperative, no distress  Neuro:  alert and oriented    Data Review (Labs):    Recent Labs     07/07/21  0552 07/06/21  0619 07/05/21  0516 07/04/21  1850 07/04/21  1849   WBC 7.4 6.6 10.9 9.5  --    HGB 7.5* 7.3* 8.0* 8.9*  --    HCT 25.8* 24.3* 28.3* 30.0*  --    * 149* 149* 193  --    MCV 93.1 91.7 96.3 92.9  --     142 142 141  --    K 3.4* 3.6 3.9 3.9  --     104 106 105  --    CO2 32 32 28 29  --    BUN 49* 48* 44* 44*  --    CREA 1.92* 1.84* 1.62* 1.68*  --    CA 8.5 8.8 8.7 8.9  --    MG  --  1.9 2.1 1.7*  --    GLU 88 96 85 97  --    AP  --   --  182* 197*  --    AST  --   --  16 21  --    ALT  --   --  12 14  --    TBILI  --   --  0.5 0.6  --    ALB  --   --  2.2* 2.5*  --    TP  --   --  7.0 7.6  --    PTP  --   --   --   --  15.0*   INR  --   --   --   --  1.2       Assessment:     Principal Problem:    Acute respiratory failure with hypoxia and hypercapnia (HCC) (7/4/2021)    Active Problems:    Ascites due to alcoholic cirrhosis ([facility] (10/8/2019)      Aortic valve stenosis (10/8/2019)      Anasarca (11/19/2019)      Acute on chronic systolic heart failure (HCC) (7/4/2021)      Stage 3 chronic kidney disease ([facility] (7/4/2021)      Portal hypertension (HCC) (7/4/2021)      Pleural effusion on right (7/6/2021)      SBP (spontaneous bacterial peritonitis) ([facility] (7/6/2021)    70 yr old male with decompensated cirrhosis with ascites requiring frequent paracentesis now complicated by SBP. Patient also likely has hepatic hydrothorax as >1500 cc of fluid removed via right thoracentesis earlier and note of minimal perihepatic ascites. In addition. .. patient has significant cardiac disease including cardiomyopathy and EF of 15% as well severe AS.     He does not have any GI bleeding or encephalopathy at present but despite this I believe it is appropriate that hospice care be pursued which patient's wife has spoken to palliative care about. Plan:     1. Plans for hospice discharge noted  2. Antibx for SBP here; will not be continued under hospice guidelines  3. We will sign off; please call as needed    Patient is seen and examined in collaboration with Dr. Tomas Santos. Assessment and plan as per Dr. Tomas Santos.   Bj Myers NP

## 2021-07-07 NOTE — HOSPICE
[facility]    9550 Contacted patient's wife Jordon Dawn regarding hospice referral.  She states she has to return to work on Monday (she works full time, has the ability to take [employer] but cannot afford the reduced pay) and she does not have arrangements for someone to stay with Mr. Sandrine Tinsley around the clock. We explored options like family/Zoroastrianism/neighbor support or hiring caregivers but she denies these as options. Will discuss with KERON Wilkerson. Update: 1600 pm Spouse has arranged to work from home the majority of the time and will have friends stay with patient if she has to leave. Educated her on hospice Medicare benefit including philosophy of care, interdisciplinary team, and levels of care with patient on speaker phone. Both agree to discharge home with hospice support today. Patient approved per [facility] Benitez director Dr. Douglas Johnson, DME ordered for delivery to the home by 5 pm.  KERON Wilkerson to arrange ambulance transportation. [facility] admission nurse will see him this evening at his home.     Thank you for this referral,    Denis Muir RN, BSN  [facility] lia[facility]  [phone]

## 2021-07-07 NOTE — PROGRESS NOTES
Pt to d/c home with [facility] services today. Transport scheduled via [employer] with p/u time of [address] CM updated [address] who in turn updated pt's spouse while calling her with other information. Pt is currently requiring 4L O2 NC. DME will be delivered (set up through [facility]) prior to pt's arrival.  Transport DNR signed by pt. No other supportive care needs identified. CM will continue to follow plan of care. Care Management Interventions  PCP Verified by CM: Yes Lorie Alejandro MD)  Mode of Transport at Discharge: BLS ([employer])  Transition of Care Consult (CM Consult): Discharge Planning, [place]: Yes  Discharge Durable Medical Equipment: No  Physical Therapy Consult: Yes  Occupational Therapy Consult: Yes  Speech Therapy Consult: No  Current Support Network: Lives with Spouse, Own Home  Confirm Follow Up Transport: Other (see comment) [facility])  The Plan for Transition of Care is Related to the Following Treatment Goals : Pt to obtain care to become medically stable and to d/c home with hospice for end of life care.   The Patient and/or Patient Representative was Provided with a Choice of Provider and Agrees with the Discharge Plan?: Yes  Name of the Patient Representative Who was Provided with a Choice of Provider and Agrees with the Discharge Plan: Esteban Caldwell  Freedom of Choice List was Provided with Basic Dialogue that Supports the Patient's Individualized Plan of Care/Goals, Treatment Preferences and Shares the Quality Data Associated with the Providers?: Yes   Resource Information Provided?: No  Discharge Location  Discharge Placement: Home with hospice [facility])

## 2021-07-07 NOTE — PROGRESS NOTES
ACUTE PHYSICAL THERAPY GOALS:  (Developed with and agreed upon by patient and/or caregiver. )  LTG:  (1.)Mr. Amber Rojas will move from supine to sit and sit to supine  in bed with STAND BY ASSIST within 7 treatment day(s). (2.)Mr. Amber Rojas will transfer from bed to chair and chair to bed with STAND BY ASSIST using the least restrictive device within 7 treatment day(s). (3.)Mr. Amber Rojas will ambulate with STAND BY ASSIST for 30+ feet with the least restrictive device within 7 treatment day(s). ________________________________________________________________________________________________      PHYSICAL THERAPY ASSESSMENT: Initial Assessment and Daily Note PT Treatment Day # 1      Richar Kyle is a 70 y.o. male   PRIMARY DIAGNOSIS: Acute respiratory failure with hypoxia and hypercapnia (HCC)  [facility] [J96.01]       Reason for Referral:     ICD-10: Treatment Diagnosis: Other abnormalities of gait and mobility (R26.89)  [place]PAT[place] / [place] / Product Type: Medicare /     ASSESSMENT:     REHAB RECOMMENDATIONS:   Recommendation to date pending progress:  [address] [place] [address]   vs [facility] pending d/c plans  Equipment:    Rolling Walker     PRIOR LEVEL OF FUNCTION:  (Prior to Hospitalization) INITIAL/CURRENT LEVEL OF FUNCTION:  (Most Recently Demonstrated)   Bed Mobility:   sleeps in lift chair  Sit to Stand:   Modified Independent  Transfers:   Modified Independent  Gait/Mobility:   Modified Independent Bed Mobility:   Moderate Assistance  Sit to Stand:   Moderate Assistance  Transfers:   Minimal Assistance  Gait/Mobility:   Minimal Assistance     ASSESSMENT:  Mr. Amber Rojas lives with his wife and ambulates independently in home with a rollator. He sleeps in a lift chair. Patient has declined in functional mobility. Patient and wife are considering hospice care at discharge.   Pending their decision, patient would benefit from STR vs HHPT. Mr. Luis Mo would benefit from skilled physical therapy (medically necessary) to address his deficits and maximize his function. .     SUBJECTIVE:   Mr. Luis Mo states, \"I haven't moved in a couple days. .\"    SOCIAL HISTORY/LIVING ENVIRONMENT: Mr. Luis Mo lives with his wife and ambulates independently in home with a rollator. He sleeps in a lift chair. Home Environment: Private residence  One/Two Story Residence: Two story, live on 1st floor  Living Alone: No  Support Systems: Family member(s), Spouse/Significant Other/Partner, Home care staff  OBJECTIVE:     PAIN: VITAL SIGNS: LINES/DRAINS:   Pre Treatment: Pain Screen  Pain Scale 1: Numeric (0 - 10)  Pain Intensity 1: 0  Post Treatment: 0  SpO2 remained >89% on O2 via nasal cannula.  IV  O2 Device: Nasal cannula     GROSS EVALUATION:  B LE's Within Functional Limits Abnormal/ Functional Abnormal/ Non-Functional (see comments) Not Tested Comments:   AROM [] [x] [] []    PROM [] [] [] []    Strength [] [] [x] []    Balance [] [] [x] []    Posture [] [x] [] []    Sensation [] [] [] []    Coordination [] [] [] []    Tone [] [] [] []    Edema [] [] [] []    Activity Tolerance [] [] [x] []     [] [] [] []      COGNITION/  PERCEPTION: Intact Impaired   (see comments) Comments:   Orientation [] []    Vision [] []    Hearing [x] []    Command Following [x] []    Safety Awareness [] []     [] []      MOBILITY: I Mod I S SBA CGA Min Mod Max Total  NT x2 Comments:   Bed Mobility    Rolling [] [] [] [] [] [] [] [] [] [x] []    Supine to Sit [] [] [] [] [] [] [x] [] [] [] [x]    Scooting [] [] [] [] [] [] [x] [] [] [] [x]    Sit to Supine [] [] [] [] [] [] [] [] [] [x] []    Transfers    Sit to Stand [] [] [] [] [] [] [x] [] [] [] [x]    Bed to Chair [] [] [] [] [] [x] [] [] [] [] []    Stand to Sit [] [] [] [] [] [x] [] [] [] [] []    I=Independent, Mod I=Modified Independent, S=Supervision, SBA=Standby Assistance, CGA=Contact Guard Assistance, Min=Minimal Assistance, Mod=Moderate Assistance, Max=Maximal Assistance, Total=Total Assistance, NT=Not Tested  GAIT: I Mod I S SBA CGA Min Mod Max Total  NT x2 Comments:   Level of Assistance [] [] [] [] [] [x] [] [] [] [] []    Distance 3'    DME Rolling Walker    Gait Quality Slow, short steps    Weightbearing Status N/A     I=Independent, Mod I=Modified Independent, S=Supervision, SBA=Standby Assistance, CGA=Contact Guard Assistance,   Min=Minimal Assistance, Mod=Moderate Assistance, Max=Maximal Assistance, Total=Total Assistance, NT=Not [facility]       How much difficulty does the patient currently have. .. Unable A Lot A Little None   1. Turning over in bed (including adjusting bedclothes, sheets and blankets)? [] 1   [x] 2   [] 3   [] 4   2. Sitting down on and standing up from a chair with arms ( e.g., wheelchair, bedside commode, etc.)   [] 1   [x] 2   [] 3   [] 4   3. Moving from lying on back to sitting on the side of the bed? [] 1   [x] 2   [] 3   [] 4   How much help from another person does the patient currently need. .. Total A Lot A Little None   4. Moving to and from a bed to a chair (including a wheelchair)? [] 1   [x] 2   [] 3   [] 4   5. Need to walk in hospital room? [] 1   [] 2   [x] 3   [] 4   6. Climbing 3-5 steps with a railing? [] 1   [x] 2   [] 3   [] 4   © 2007, Trustees of [address] [place] [id number], under license to [employer]. All rights reserved     Score:  Initial: 13 Most Recent: X (Date: -- )    Interpretation of Tool:  Represents activities that are increasingly more difficult (i.e. Bed mobility, Transfers, Gait). PLAN:   FREQUENCY/DURATION: PT Plan of Care: 3 times/week for duration of hospital stay or until stated goals are met, whichever comes first.    PROBLEM LIST:   (Skilled intervention is medically necessary to address:)  1. Decreased Activity Tolerance  2.  Decreased Balance  3. Decreased Gait Ability  4. Decreased Strength  5. Decreased Transfer Abilities   INTERVENTIONS PLANNED:   (Benefits and precautions of physical therapy have been discussed with the patient.)  1. Therapeutic Activity  2. Therapeutic Exercise/HEP  3. Neuromuscular Re-education  4. Gait Training  5. Manual Therapy  6. Education     TREATMENT:     EVALUATION: Moderate Complexity : (Untimed Charge)    TREATMENT:   ($$ Therapeutic Exercises: 8-22 mins    )  Co-Treatment PT/OT necessary due to patient's decreased overall endurance/tolerance levels, as well as need for high level skilled assistance to complete functional transfers/mobility and functional tasks  Therapeutic Exercise (8 Minutes): Therapeutic exercises noted below to improve functional activity tolerance, strength and mobility.      TREATMENT GRID:      DATE: 7/7/21       Ambulation        Hip Flexion        Long Arc Quads x7 AB       Hip ab/ad        Heel Raises        Toe Raises        Standing w/ RW x4'       Hip ER/IR x1                Key:  A=active, AA=active assisted, P=passive, B=bilaterally, R=right, L=left   DF=dorsiflexion, PF=plantarflexion    AFTER TREATMENT POSITION/PRECAUTIONS:  Chair, Needs within reach, RN notified and Visitors at bedside    INTERDISCIPLINARY COLLABORATION:  RN/PCT, PT/PTA, OT/AGURIRE and RN Case Manager/     TOTAL TREATMENT DURATION:  PT Patient Time In/Time Out  Time In: 0918  Time Out: Sharon 0650, PT, DPT

## 2021-07-07 NOTE — DISCHARGE SUMMARY
[facility] Hospitalist Discharge Summary     Name:  Indira Amaya    Age:71 y.o.    Sex:male  :  1950       MRN:  [id number]       Admitting Physician: Mario Tafoya MD Admit Date: 2021  6:45 PM   Attending Physician: Elder Whitehead MD  Primary Care Physician: Checo Collado MD       Discharge Physician: Laci Mohr MD  Discharge date: 21   Discharged Condition: Stable, hospice    Indication for Admission:   Chief Complaint   Patient presents with    Shortness of Breath    Dizziness        Reasons for hospitalization:  Hospital Problems as of 2021 Date Reviewed: 2021        Codes Class Noted - Resolved POA    Pleural effusion on right ICD-10-CM: J90  ICD-9-CM: 511.9  2021 - Present Unknown        SBP (spontaneous bacterial peritonitis) ([facility] ICD-10-CM: J77.1  ICD-9-CM: 567.23  2021 - Present Unknown        Acute on chronic systolic heart failure ([facility] ICD-10-CM: I50.23  ICD-9-CM: 428.23  2021 - Present Yes        Stage 3 chronic kidney disease ([facility] ICD-10-CM: N18.30  ICD-9-CM: 585.3  2021 - Present Yes        Portal hypertension ([facility] ICD-10-CM: K76.6  ICD-9-CM: 572.3  2021 - Present Yes        * (Principal) Acute respiratory failure with hypoxia and hypercapnia ([facility] ICD-10-CM: J96.01, J96.02  ICD-9-CM: 518.81  2021 - Present Unknown        Anasarca ICD-10-CM: R60.1  ICD-9-CM: 782.3  2019 - Present Yes        Ascites due to alcoholic cirrhosis ([facility] BEU-97-FB: K70.31  ICD-9-CM: 571.2  10/8/2019 - Present Unknown        Aortic valve stenosis ICD-10-CM: I35.0  ICD-9-CM: 424.1  10/8/2019 - Present Yes                 Discharge Diagnosis: Acute hypercapnic/hypoxic respiratory failure secondary to acute on chronic systolic CHF exacerbation, decompensated cirrhosis, hepatic hydrothorax, SBP  Did Patient have Sepsis (YES OR NO): no      Hospital Course/Interval history:  Mr. Mark Guillermo is a 69 y/o WM with a h/o severe AS, chronic sCHF (LVEF 15-20% May 2021), EtOH cirrhosis with portal HTN, hypothyroidism who was admitted to our service on 7/4 with acute hypoxemic respiratory failure 2/2 acute on chronic sCHF exacerbation. Started on IV Bumex + Albumin, Bipap and empiric antibiotics and admitted to ICU. Cardiology consulted. Initially started on low dose Levophed for hypotension, though was weaned off rather quickly. He had paracentesis with fluid studies distant with SBP. He was already on IV Rocephin this was continued for 3 days per GI recommendations. They recommended to transition to p.o. Bactrim twice daily for SBP prophylaxis in the outpatient setting. He was transitioned to p.o. midodrine for continued hypotension in setting of continued diuresis. Patient was seen by cardiology, gastroenterology, and palliative care during hospitalization. Due to multitude of end-stage chronic medical conditions, palliative care recommended hospice which patient and wife are agreeable to going home with. Patient's kidney function continues to slowly worsen with diuresis and he continued to require O2 via nasal cannula for acute hypoxic respiratory failure secondary to above. He is being discharged to home with home hospice with comfort medications as well as PO medications for continued management of his heart failure, cirrhosis and SBP. Assessment/Plan:  # Acute hypercapneic/hypoxic respiratory failure 2/2 AoC sCHF, decompensated cirrhosis, anasarca, ascites, pleural effusion/hepatic hydrothorax              - Stable on 3L NC, cont wean as tolerated. - Con't IV diuresis with bumex drip due to labile BP's. - Albumin added to assist with diuresis              - cont Abx Rocephin for SBP, Azithro for possible CAP              - Cardiology following.  Refused Neville in ER and again today, unclear urine output but I/O's reports net -5L.               - IR performed thoracentesis 7/6 with 1650 mL yellow fluid removed, fluid studies ordered   -Transition to Lasix/Aldactone at home for volume management with hospice. - Home with Hospice      # Hypotension              - cont midodrine TID              - s/p Albumin   - Home with hospice     # Cirrhosis  #SBP              - Therapeutic para 7/5 with WBC 5,255 and 96% neutrophils, no report of PMNs, Glucose<1, Ascitic Cx's NGTD              - s/p Rocephin IV x3 doses, transition to Bactrim BID outpatient for SBP ppx              - continued IV diuresis inpatient, transition to Lasix/Aldactone outpt              - Consulted GI and Palliative Care    -Scheduled paracentesis order sent to IR for frequent palliative paracenteses   - Home with Hospice     # Bilateral infiltrates on CXR              - Likely fluid, con't diuresis, rapid COVID neg     # CKD              - worsened today. Monitor with diuresis. - Home with Hospice     # Hypothyroidism              - Synthroid    Consults:   IP CONSULT TO CARDIOLOGY  IP CONSULT TO PULMONOLOGY  IP CONSULT TO PALLIATIVE CARE - PROVIDER  IP CONSULT TO GASTROENTEROLOGY     Disposition: Home Hospice  Diet:   DIET ADULT  Code Status: DNR      Follow up with PCP in 3-5 days. Will need repeat Potassium and Renal Function check. Current Discharge Medication List      START taking these medications    Details   midodrine (PROAMATINE) 10 mg tablet Take 1 Tablet by mouth three (3) times daily (with meals) for 30 days. Qty: 90 Tablet, Refills: 0  Start date: 7/7/2021, End date: 8/6/2021      morphine (ROXANOL) 100 mg/5 mL (20 mg/mL) concentrated solution Take 0.25 mL by mouth every two (2) hours as needed for Pain or Shortness of Breath for up to 5 days. Max Daily Amount: 60 mg.  Qty: 10 mL, Refills: 0  Start date: 7/7/2021, End date: 7/12/2021    Associated Diagnoses: Hospice care patient      LORazepam (INTENSOL) 2 mg/mL concentrated solution Take 0.5 mL by mouth every eight (8) hours as needed for Anxiety or Restlessness for up to 15 days.  Max Daily Amount: 3 mg. Qty: 10 mL, Refills: 0  Start date: 7/7/2021, End date: 7/22/2021    Associated Diagnoses: Hospice care patient      haloperidol (HALDOL) 2 mg/mL oral concentrate Take 1 mL by mouth every six (6) hours as needed for Agitation or Psychosis for up to 15 days. Qty: 30 mL, Refills: 0  Start date: 7/7/2021, End date: 7/22/2021      trimethoprim-sulfamethoxazole (BACTRIM DS, SEPTRA DS) 160-800 mg per tablet Take 1 Tablet by mouth two (2) times a day for 30 days. Qty: 60 Tablet, Refills: 0  Start date: 7/7/2021, End date: 8/6/2021         CONTINUE these medications which have CHANGED    Details   furosemide (Lasix) 40 mg tablet Take 1 Tablet by mouth daily for 30 days. Take 1 and 1/2 tablets daily. Qty: 30 Tablet, Refills: 0  Start date: 7/7/2021, End date: 8/6/2021         CONTINUE these medications which have NOT CHANGED    Details   tamsulosin (Flomax) 0.4 mg capsule Take 1 Cap by mouth daily. Indications: enlarged prostate with urination problem, stones in the urinary tract  Qty: 90 Cap, Refills: 3      spironolactone (ALDACTONE) 50 mg tablet Take 1 Tab by mouth daily. Qty: 90 Tab, Refills: 3      clotrimazole-betamethasone (LOTRISONE) topical cream Apply  to affected area two (2) times a day.   Qty: 45 g, Refills: 2         STOP taking these medications       omeprazole (PRILOSEC) 20 mg capsule Comments:   Reason for Stopping:         levothyroxine (SYNTHROID) 75 mcg tablet Comments:   Reason for Stopping:         FLUoxetine (PROzac) 20 mg capsule Comments:   Reason for Stopping:               Medications Discontinued During This Encounter   Medication Reason    metoprolol tartrate (LOPRESSOR) 25 mg tablet Not A Current Medication    tadalafil (CIALIS) 20 mg tablet Not A Current Medication    sodium chloride (NS) flush 1-69 mL DUPLICATE ORDER    sodium chloride (NS) flush 7-99 mL DUPLICATE ORDER    albumin human 25% (BUMINATE) solution 50 g     furosemide (LASIX) injection 80 mg     cefTRIAXone (ROCEPHIN) 1 g in 0.9% sodium chloride (MBP/ADV) 50 mL MBP     traMADoL (ULTRAM) tablet 50 mg     albumin human 25% (BUMINATE) solution 25 g     furosemide (Lasix) 80 mg tablet REORDER         Follow Up Orders: Follow-up Appointments   Procedures    FOLLOW UP VISIT Appointment in: 3 - 5 Days     Standing Status:   Standing     Number of Occurrences:   1     Order Specific Question:   Appointment in     Answer:   3 - 5 Days         Follow-up Information     Follow up With Specialties Details Why Contact Info    Sanju Gonzalez MD Internal Medicine   [address] [place] [address]  [address]  [phone]              Discharge Exam:    Patient Vitals for the past 24 hrs:   Temp Pulse Resp BP SpO2   07/07/21 1130 97.7 °F (36.5 °C) (!) 104 22 100/69 100 %   07/07/21 0730 97.8 °F (36.6 °C) 96 20 95/71 99 %   07/07/21 0222 97.4 °F (36.3 °C) 91 20 92/69 96 %   07/06/21 2305 97.7 °F (36.5 °C) 92 20 (!) 88/67 97 %   07/06/21 2138     96 %   07/06/21 1923 98.2 °F (36.8 °C) 96 20 91/66 95 %   07/06/21 1500 97.6 °F (36.4 °C) 97 20 97/75 100 %     Oxygen Therapy  O2 Sat (%): 100 % (07/07/21 1130)  Pulse via Oximetry: 102 beats per minute (07/06/21 1133)  O2 Device: Nasal cannula (07/07/21 0815)  Skin Assessment: Skin breakdown present (see comment/note) (07/07/21 0815)  Skin Protection for O2 Device: N/A (07/07/21 0815)  O2 Flow Rate (L/min): 4 l/min (07/07/21 0815)  FIO2 (%): 30 % (07/05/21 0734)    Estimated body mass index is 29.4 kg/m² as calculated from the following:    Height as of this encounter: 5' 11\" (1.803 m). Weight as of this encounter: 95.6 kg (210 lb 12.8 oz). Intake/Output Summary (Last 24 hours) at 7/7/2021 1411  Last data filed at 7/7/2021 1305  Gross per 24 hour   Intake 824 ml   Output 1400 ml   Net -576 ml       *Note that automatically entered I/Os may not be accurate; dependent on patient compliance with collection and accurate  by assistants.     Physical Exam:   General:  No acute distress, speaking in full sentences, no use of accessory muscles. On 3-4L NC   HEENT:  Pupils equal and reactive to light and accommodation, oropharynx is clear   Neck:   Supple, no lymphadenopathy, no JVD   Lungs:  Clear to auscultation bilaterally   CV:  Regular rate and rhythm with normal S1 and S2   Abdomen: Soft, tender to palpation, nondistended, normoactive bowel sounds   Extremities:  No cyanosis clubbing. 1+ pitting edema BLLE. Neuro:  Nonfocal, A&O x3   Psych:  Normal affect       All Labs from Last 24 Hrs:  Recent Results (from the past 24 hour(s))   PROCALCITONIN    Collection Time: 07/06/21  9:59 PM   Result Value Ref Range    Procalcitonin 7.72 ng/mL   METABOLIC PANEL, BASIC    Collection Time: 07/07/21  5:52 AM   Result Value Ref Range    Sodium 143 136 - 145 mmol/L    Potassium 3.4 (L) 3.5 - 5.1 mmol/L    Chloride 105 98 - 107 mmol/L    CO2 32 21 - 32 mmol/L    Anion gap 6 (L) 7 - 16 mmol/L    Glucose 88 65 - 100 mg/dL    BUN 49 (H) 8 - 23 MG/DL    Creatinine 1.92 (H) 0.8 - 1.5 MG/DL    GFR est AA 45 (L) >60 ml/min/1.73m2    GFR est non-AA 37 (L) >60 ml/min/1.73m2    Calcium 8.5 8.3 - 10.4 MG/DL   CBC WITH AUTOMATED DIFF    Collection Time: 07/07/21  5:52 AM   Result Value Ref Range    WBC 7.4 4.3 - 11.1 K/uL    RBC 2.77 (L) 4.23 - 5.6 M/uL    HGB 7.5 (L) 13.6 - 17.2 g/dL    HCT 25.8 (L) 41.1 - 50.3 %    MCV 93.1 79.6 - 97.8 FL    MCH 27.1 26.1 - 32.9 PG    MCHC 29.1 (L) 31.4 - 35.0 g/dL    RDW 17.0 (H) 11.9 - 14.6 %    PLATELET 660 (L) 837 - 450 K/uL    MPV 10.2 9.4 - 12.3 FL    ABSOLUTE NRBC 0.00 0.0 - 0.2 K/uL    DF AUTOMATED      NEUTROPHILS 83 (H) 43 - 78 %    LYMPHOCYTES 8 (L) 13 - 44 %    MONOCYTES 8 4.0 - 12.0 %    EOSINOPHILS 1 0.5 - 7.8 %    BASOPHILS 0 0.0 - 2.0 %    IMMATURE GRANULOCYTES 1 0.0 - 5.0 %    ABS. NEUTROPHILS 6.2 1.7 - 8.2 K/UL    ABS. LYMPHOCYTES 0.6 0.5 - 4.6 K/UL    ABS. MONOCYTES 0.6 0.1 - 1.3 K/UL    ABS. EOSINOPHILS 0.1 0.0 - 0.8 K/UL    ABS. BASOPHILS 0.0 0.0 - 0.2 K/UL    ABS. IMM. GRANS. 0.1 0.0 - 0.5 K/UL       All Micro Results     Procedure Component Value Units Date/Time    CULTURE, BODY FLUID Herschell Rinks STAIN [[id number]] Collected: 07/06/21 1125    Order Status: Completed Specimen: Pleural Fluid Updated: 07/07/21 0935     Special Requests: NO SPECIAL REQUESTS        GRAM STAIN NO WBC'S SEEN         NO DEFINITE ORGANISM SEEN        Culture result:       NO GROWTH AFTER SHORT PERIOD OF INCUBATION. FURTHER RESULTS TO FOLLOW AFTER OVERNIGHT INCUBATION. CULTURE, BODY FLUID W Lova Goltz [[id number]]  (Abnormal) Collected: 07/05/21 0827    Order Status: Completed Specimen:  Body Fluid from Abdominal Fluid Updated: 07/07/21 0737     Special Requests: NO SPECIAL REQUESTS        GRAM STAIN 0 TO 1 WBC'S/OIF      NO DEFINITE ORGANISM SEEN        Culture result:       GRAM POSITIVE COCCI ISOLATED FROM THIO BROTH ONLY IDENTIFICATION AND SUSCEPTIBILITY TO FOLLOW          CULTURE, BLOOD [[id number]] Collected: 07/04/21 2203    Order Status: Completed Specimen: Blood Updated: 07/07/21 0716     Special Requests: --        RIGHT  HAND       Culture result: NO GROWTH 3 DAYS       CULTURE, BLOOD [[id number]] Collected: 07/04/21 2321    Order Status: Completed Specimen: Blood Updated: 07/07/21 0716     Special Requests: --        RIGHT  HAND       Culture result: NO GROWTH 3 DAYS       AFB CULTURE + SMEAR W/RFLX ID FROM CULTURE [[id number]] Collected: 07/05/21 0827    Order Status: Completed Specimen: Miscellaneous sample Updated: 07/06/21 1636     Source PARACENTESIS FLUID        AFB Specimen processing Concentration     Acid Fast Smear Negative        Comment: (NOTE)  Performed At: [address] [id number]  Victor Manuel Milner MD FK:[id number]          Acid Fast Culture PENDING    AFB CULTURE + SMEAR W/RFLX ID FROM CULTURE [[id number]] Collected: 07/06/21 1125    Order Status: Completed Updated: 07/06/21 1431    CULTURE, BODY FLUID Toñito Anglin [[id number]] Collected: 07/06/21 1100    Order Status: Canceled Specimen: Body Fluid from Pleural Fluid     AFB CULTURE + SMEAR W/RFLX ID FROM CULTURE [[id number]] Collected: 07/06/21 1100    Order Status: Canceled     COVID-19 RAPID TEST [[id number]] Collected: 07/05/21 0000    Order Status: Completed Specimen: Nasopharyngeal Updated: 07/05/21 0020     Specimen source NASAL        COVID-19 rapid test Not detected        Comment:      The specimen is NEGATIVE for SARS-CoV-2, the novel coronavirus associated with COVID-19. A negative result does not rule out COVID-19. This test has been authorized by the FDA under an Emergency Use Authorization (EUA) for use by authorized laboratories. Fact sheet for Healthcare Providers: ConventionUpdate.co.nz  Fact sheet for Patients: [employer]       Methodology: Isothermal Nucleic Acid Amplification         CULTURE, BODY FLUID Sae Sleight STAIN [[id number]]     Order Status: Canceled Specimen: Body Fluid from Ascitic Fluid           SARS-CoV-2 Lab Results  \"Novel Coronavirus\" Test: No results found for: COV2NT   \"Emergent Disease\" Test: No results found for: EDPR  \"SARS-COV-2\" Test: No results found for: XGCOVT  Rapid Test:   Lab Results   Component Value Date/Time    COVR Not detected 07/05/2021 12:00 AM            Diagnostic Imaging/Tests:   IR THORACENTESIS/INSERT CHEST TUBE    Result Date: 5/7/5176  Uncomplicated ultrasound guided right thoracentesis. XR CHEST PORT    Result Date: 7/4/2021  Bibasilar pleural effusions and airspace disease reflecting atelectasis or infiltrates. Echocardiogram/EKG results:  No results found for this visit on 07/04/21.     EKG Results     Procedure 720 Value Units Date/Time    EKG [[id number]] Collected: 07/04/21 1857    Order Status: Completed Updated: 07/05/21 1110     Ventricular Rate 90 BPM      Atrial Rate 90 BPM      P-R Interval 180 ms      QRS Duration 80 ms Q-T Interval 328 ms      QTC Calculation (Bezet) 401 ms      Calculated P Axis 85 degrees      Calculated R Axis 89 degrees      Calculated T Axis 159 degrees      Diagnosis --     !! AGE AND GENDER SPECIFIC ECG ANALYSIS !! Sinus rhythm with sinus arrhythmia  Inferior infarct (cited on or before 04-JUL-2021)  Anterior infarct (cited on or before 04-JUL-2021)  Abnormal ECG  When compared with ECG of 04-JUL-2021 18:55,    Confirmed by LUIS FUNG ()KAMRAN ([id number]) on 7/5/2021 11:10:22 AM            Results for orders placed or performed during the hospital encounter of 07/04/21   EKG, 12 LEAD, INITIAL   Result Value Ref Range    Ventricular Rate 90 BPM    Atrial Rate 90 BPM    P-R Interval 180 ms    QRS Duration 80 ms    Q-T Interval 328 ms    QTC Calculation (Bezet) 401 ms    Calculated P Axis 85 degrees    Calculated R Axis 89 degrees    Calculated T Axis 159 degrees    Diagnosis       !! AGE AND GENDER SPECIFIC ECG ANALYSIS !! Sinus rhythm with sinus arrhythmia  Inferior infarct (cited on or before 04-JUL-2021)  Anterior infarct (cited on or before 04-JUL-2021)  Abnormal ECG  When compared with ECG of 04-JUL-2021 18:55,    Confirmed by Waylon Wright MD ()KAMRAN ([id number]) on 7/5/2021 11:10:22 AM         Procedures done this admission:  * No surgery found *        Time spent in patient discharge planning and coordination 35 minutes.       Signed By: Lelo Walker MD   [facility] SPECIALTY [facility] Hospitalist Service    July 7, 2021  2:11 PM

## 2021-07-07 NOTE — PROGRESS NOTES
Dr. Carter Allan and CM spoke with pt and spouse during IDR about d/c planning. Both are in agreement with home hospice services. CM made a referral to [facility] at approximately 1110 with a note stating that physician wants to d/c pt today. CM is awaiting to hear from an [facility] liaison. Pt will need home DME set up before he arrives home. CM will continue to follow and remain available if any needs arise.

## 2021-07-07 NOTE — PROGRESS NOTES
Assumed care of the patient. Off going report given by Ju Roldan. The patient is AO x 4 at this time and is resting in bed. IV access: PIV is infusing Bumex and the 2nd IV saline locked. Oxygen needs: 4L via nc  Pain assessment: NAD at this time. Safety: Bed is low and call light is within reach. Patient understands to call for assistance.

## 2021-07-07 NOTE — PROGRESS NOTES
Discharged patient to home with hospice care via [facility] transportation. Patient left AOx4 on 4L Oxygen via nc. AVS sent with patient.    Visit Vitals  BP 93/68 (BP 1 Location: Left upper arm, BP Patient Position: Sitting)   Pulse (!) 108   Temp 97.6 °F (36.4 °C)   Resp 20   Ht 5' 11\" (1.803 m)   Wt 95.6 kg (210 lb 12.8 oz)   SpO2 94%   BMI 29.40 kg/m²

## 2021-07-07 NOTE — PROGRESS NOTES
ACUTE OT GOALS:  (Developed with and agreed upon by patient and/or caregiver.)  1. Patient will complete lower body bathing and dressing with Min A and adaptive equipment as needed. 2. Patient will complete toileting with Min A and adaptive equipment as needed. 3. Patient will tolerate 23 minutes of OT treatment with 1-2 rest breaks to increase activity tolerance for ADLs. 4. Patient will complete functional transfers with Min A and adaptive equipment as needed. 5. Patient will complete standing grooming ADL with Min A for standing balance and adaptive equipment as needed. 6. Patient will complete BUE exercises to increase strength in BUE for performance of ADLs and functional mobility.     Timeframe: 7 visits     OCCUPATIONAL THERAPY: Daily Note and PM OT Treatment Day # 1    Lorence Osler is a 70 y.o. male   PRIMARY DIAGNOSIS: Acute respiratory failure with hypoxia and hypercapnia (HCC)  Acute respiratory failure with hypoxia ([facility] [J96.01]       [place] / [place] / Product Type: Medicare /   ASSESSMENT:     REHAB RECOMMENDATIONS: CURRENT LEVEL OF FUNCTION:  (Most Recently Demonstrated)   Recommendation to date pending progress:  [address] [place] [address] Therapy  Equipment:    3 in 1 Bedside Commode Bathing:   Not tested  Dressing:   Not tested  Feeding/Grooming:   Not tested  Toileting:   Not tested  Functional Mobility:   Mod to Max A x 2 for transfers; Min A with use of RW for gait. ASSESSMENT:  Mr. Rojas Alva seen this PM to assist with return to bed per request by RN. Pt received seated in recliner chair upon arrival. Pt requires Mod to Max A x 2 with use of RW to complete sit <> stand transfer from recliner chair. Upon standing, pt with slumped posture and anterior lean. Pt requires moderate verbal, visual, tactile, and manual cueing to hold head upright and bring pelvis forward to improve standing posture and balance.  Pt requires Min A with use of RW and additional time to walk from chair to bed and complete stand to sit. Pt requires Mod A x 2 to complete sit to supine. Once in supine, pt with slight SOB and observed to be saturating at 87% on 4L O2. Reminded to used pursed-lip breathing technique and able to improve O2 sats to 94%. Pt would benefit from continued skilled OT to increase independence and safety for performance of ADLs and functional mobility. SUBJECTIVE:   Mr. Dale Kwon states, \"I'm ready to get back to bed. \"    SOCIAL HISTORY/LIVING ENVIRONMENT: Lives with spouse in one story home with 3 MARTHA (no HR). Min A for lower body dressing; independent for all other ADLs. Mod I for functional mobility with use of rollator. Sleeps in power lift recliner chair. No home O2.     OBJECTIVE:     PAIN: VITAL SIGNS: LINES/DRAINS:   Pre Treatment: Pain Screen  Pain Scale 1: Numeric (0 - 10)  Pain Intensity 1: 0  Post Treatment: Unchanged Vital Signs  O2 Sat (%): 94 %  O2 Device: Nasal cannula  O2 Flow Rate (L/min): 4 l/min None  O2 Device: Nasal cannula     ACTIVITIES OF DAILY LIVING: I Mod I S SBA CGA Min Mod Max Total NT Comments   BASIC ADLs:              Bathing/ Showering [] [] [] [] [] [] [] [] [] [x]    Toileting [] [] [] [] [] [] [] [] [] [x]    Dressing [] [] [] [] [] [] [] [] [] [x]    Feeding [] [] [] [] [] [] [] [] [] [x]    Grooming [] [] [] [] [] [] [] [] [] [x]    Personal Device Care [] [] [] [] [] [] [] [] [] [x]    Functional Mobility [] [] [] [] [] [x] [x] [x] [] [] Min to Max A x 2 with RW   I=Independent, Mod I=Modified Independent, S=Supervision, SBA=Standby Assistance, CGA=Contact Guard Assistance,   Min=Minimal Assistance, Mod=Moderate Assistance, Max=Maximal Assistance, Total=Total Assistance, NT=Not Tested    MOBILITY: I Mod I S SBA CGA Min Mod Max Total  NT x2 Comments:   Supine to sit [] [] [] [] [] [] [] [] [] [x] []    Sit to supine [] [] [] [] [] [] [x] [] [] [] [x]    Sit to stand [] [] [] [] [] [] [x] [x] [] [] [x] X RW   Bed to chair [] [] [] [] [] [] [] [] [] [x] [] Min A x 1 with RW   I=Independent, Mod I=Modified Independent, S=Supervision, SBA=Standby Assistance, CGA=Contact Guard Assistance,   Min=Minimal Assistance, Mod=Moderate Assistance, Max=Maximal Assistance, Total=Total Assistance, NT=Not Tested    BALANCE: Good Fair+ Fair Fair- Poor NT Comments   Sitting Static [x] [] [] [] [] []    Sitting Dynamic [] [x] [] [] [] []              Standing Static [] [x] [] [] [] []    Standing Dynamic [] [] [x] [] [] [] X RW     PLAN:   FREQUENCY/DURATION: OT Plan of Care: 3 times/week for duration of hospital stay or until stated goals are met, whichever comes first.    TREATMENT:   TREATMENT:   Therapeutic Activity (15 Minutes): Therapeutic activity included Sit to Supine, Scooting, Transfer Training, Ambulation on level ground, Sitting balance  and Standing balance to improve functional Mobility, Strength and Activity tolerance. TREATMENT GRID:  N/A    AFTER TREATMENT POSITION/PRECAUTIONS:  Bed, Needs within reach, RN notified and table tray adjacent to pt.     INTERDISCIPLINARY COLLABORATION:  RN/PCT and OT/AGUIRRE    TOTAL TREATMENT DURATION:  OT Patient Time In/Time Out  Time In: 1543  Time Out: Elvin 25 Britney OTR/L

## 2021-07-07 NOTE — PROGRESS NOTES
[facility] CARDIOLOGY PROGRESS NOTE           7/7/2021 12:28 PM    Admit Date: 7/4/2021    Subjective:   Patient seen at bedside with family members. Shortness of breath stable, denies abdominal pain or swelling. Some leg swelling. No chest pain or palpitations. No other complaints at this time. ROS:  Cardiovascular:  As noted above    Objective:      Vitals:    07/06/21 2305 07/07/21 0222 07/07/21 0730 07/07/21 1130   BP: (!) 88/67 92/69 95/71 100/69   Pulse: 92 91 96 (!) 104   Resp: 20 20 20 22   Temp: 97.7 °F (36.5 °C) 97.4 °F (36.3 °C) 97.8 °F (36.6 °C) 97.7 °F (36.5 °C)   SpO2: 97% 96% 99% 100%   Weight: 210 lb 12.8 oz (95.6 kg)      Height:         Physical Exam:  General-No Acute Distress, awake, alert  Neck- supple, no JVD  CV- regular rate and rhythm, high-pitched right upper sternal border murmur holosystolic 2/6  Lung-decreased at the bases bilaterally  Abd- soft, nontender, mildly distended  Ext- mild leg edema bilaterally. Skin- warm and dry    Data Review:   Recent Labs     07/07/21  0552 07/06/21  0619 07/05/21  0516 07/05/21  0516 07/04/21  1850 07/04/21  1849    142   < > 142   < >  --    K 3.4* 3.6   < > 3.9   < >  --    MG  --  1.9  --  2.1   < >  --    BUN 49* 48*   < > 44*   < >  --    CREA 1.92* 1.84*   < > 1.62*   < >  --    GLU 88 96   < > 85   < >  --    WBC 7.4 6.6   < > 10.9   < >  --    HGB 7.5* 7.3*   < > 8.0*   < >  --    HCT 25.8* 24.3*   < > 28.3*   < >  --    * 149*   < > 149*   < >  --    INR  --   --   --   --   --  1.2    < > = values in this interval not displayed.        Assessment/Plan:     Principal Problem:    Acute respiratory failure with hypercapnia (HCC) (7/4/2021)  Reasonable to attempt diuresis  Status post thoracentesis  Consider Neville placement  Hemodynamics preserved at this time but blood pressure is low limits ability to titrate heart failure therapy     Active Problems:    Alcoholic cirrhosis of liver with ascites ([facility] (10/8/2019)  Status post paracentesis, fluid cultures pending       Aortic valve stenosis (10/8/2019)  Appears severe on recent echo, diuresis as tolerated       Anasarca (11/19/2019)  Likely combination of underlying liver disease and cardiomyopathy       Acute on chronic systolic heart failure ([address] (7/4/2021)  On IV diuretics, ports minimal urine output  Creatinine elevated today consider Neville as above       Stage 3 chronic kidney disease ([facility] (7/4/2021)  Per primary       Portal hypertension ([facility] (7/4/2021)  Sequelae of underlying cirrhosis     Palliative care has seen the patient. Patient and family have elected to pursue hospice/comfort care measures which is reasonable given his multitude of medical problems which are severe. At this time we will be on standby, please call with any questions.         Sergey Gar, DO  7/7/2021 12:28 PM

## 2021-07-07 NOTE — PROGRESS NOTES
ACUTE OT GOALS:  (Developed with and agreed upon by patient and/or caregiver.)  1. Patient will complete lower body bathing and dressing with Min A and adaptive equipment as needed. 2. Patient will complete toileting with Min A and adaptive equipment as needed. 3. Patient will tolerate 23 minutes of OT treatment with 1-2 rest breaks to increase activity tolerance for ADLs. 4. Patient will complete functional transfers with Min A and adaptive equipment as needed. 5. Patient will complete standing grooming ADL with Min A for standing balance and adaptive equipment as needed. 6. Patient will complete BUE exercises to increase strength in BUE for performance of ADLs and functional mobility. Timeframe: 7 visits     OCCUPATIONAL THERAPY ASSESSMENT: Initial Assessment, Daily Note and AM OT Treatment Day # 1    Casey Montes is a 70 y.o. male   PRIMARY DIAGNOSIS: Acute respiratory failure with hypoxia and hypercapnia ([facility])  Acute respiratory failure with hypoxia ([facility] [J96.01]       Reason for Referral:  Generalized Weakness  ICD-10: Treatment Diagnosis: Generalized Muscle Weakness (M62.81)  Difficulty in walking, Not elsewhere classified (R26.2)  [place]PAT[place] / [place] / Product Type: Medicare /   ASSESSMENT:     REHAB RECOMMENDATIONS:   Recommendation to date pending progress:  Setting:   Short-term Rehab vs HHOT pending pt progress and pt and family wishes. Equipment:    3 in 1 Bedside Commode     PRIOR LEVEL OF FUNCTION:  (Prior to Hospitalization)  INITIAL/CURRENT LEVEL OF FUNCTION:  (Based on today's evaluation)   Bathing:   Independent  Dressing:   Minimal Assistance lower body dressing. Feeding/Grooming:   Independent   Toileting:   Independent  Functional Mobility:   Modified Independent with use of rollator. Bathing:   Maximal Assistance  Dressing:   Maximal Assistance  Feeding/Grooming:   Standby Assistance in seated.   Toileting:   Total Assistance  Functional Mobility:   Moderate Assistance x 2 bed mobility and transfers. ASSESSMENT:  Mr. Kamilah Olivas was admitted for acute respiratory failure with hypoxia and hypercapnia. Pt presents with deficits in overall strength, balance, and activity tolerance for performance of ADLs and functional mobility. Requires Mod A x 2 to complete transfer to edge of bed. Seated edge of bed, pt with slight increased lean to R side; provided with minimal verbal, visual, tactile, and manual cueing to hold head upright and lean to left side to improve seated posture and balance. Requires Mod A x 2 with use of RW to complete sit <> stand. Upon standing, pt with increased lean to R side and provided with minimal cueing to lean to left side to improve orientation and balance. Requires Mod A x 2 with use of RW and additional time to transfer from bed to chair. Pt initially on 4L O2 with O2 sats at 89% edge of bed. Improves O2 sats to 94% with use of pursed-lip breathing and increased rest break. Pt would benefit from continued skilled OT to increase independence and safety for performance of ADLs and functional mobility. SUBJECTIVE:   Mr. Kamilah Olivas states, \"I just want to go home. \"    SOCIAL HISTORY/LIVING ENVIRONMENT: Lives with spouse in one story home with 3 MARTHA (no HR). Min A for lower body dressing; independent for all other ADLs. Mod I for functional mobility with use of rollator. Sleeps in power lift recliner chair. No home O2. OBJECTIVE:     PAIN: VITAL SIGNS: LINES/DRAINS:   Pre Treatment: Pain Screen  Pain Scale 1: Numeric (0 - 10)  Pain Intensity 1: 0  Post Treatment: Unchanged   IV  O2 Device: Nasal cannula     GROSS EVALUATION:  BUE Within Functional Limits Abnormal/ Functional Abnormal/ Non-Functional (see comments) Not Tested Comments:   AROM [] [x] [] []    PROM [] [] [] [x]    Strength [] [x] [] []    Balance [] [x] [] [] Minimal lean to R side.    Posture [] [x] [] []    Sensation [x] [] [] [] Coordination [] [x] [] []    Tone [x] [] [] []    Edema [x] [] [] []    Activity Tolerance [] [x] [] []     [] [] [] []      COGNITION/  PERCEPTION: Intact Impaired   (see comments) Comments:   Orientation [x] []    Vision [x] []    Hearing [x] []    Judgment/ Insight [] [x] Decreased awareness of deficits. Attention [x] []    Memory [x] []    Command Following [] [x] Requires additional cueing for safe transfer techniques. Emotional Regulation [x] []     [] []      ACTIVITIES OF DAILY LIVING: I Mod I S SBA CGA Min Mod Max Total NT Comments   BASIC ADLs:              Bathing/ Showering [] [] [] [] [] [] [] [] [] [x]    Toileting [] [] [] [] [] [] [] [] [x] [] Brief management   Dressing [] [] [] [] [] [] [] [] [x] [] Sock management   Feeding [] [] [] [] [] [] [] [] [] [x]    Grooming [] [] [] [] [] [] [] [] [] [x]    Personal Device Care [] [] [] [] [] [] [] [] [] [x]    Functional Mobility [] [] [] [] [] [] [x] [] [] [] Assist x 2 x RW   I=Independent, Mod I=Modified Independent, S=Supervision, SBA=Standby Assistance, CGA=Contact Guard Assistance,   Min=Minimal Assistance, Mod=Moderate Assistance, Max=Maximal Assistance, Total=Total Assistance, NT=Not Tested    MOBILITY: I Mod I S SBA CGA Min Mod Max Total  NT x2 Comments:   Supine to sit [] [] [] [] [] [] [x] [] [] [] [x]    Sit to supine [] [] [] [] [] [] [] [] [] [x] []    Sit to stand [] [] [] [] [] [] [x] [] [] [] [x] X RW   Bed to chair [] [] [] [] [] [] [x] [] [] [] [x] X RW   I=Independent, Mod I=Modified Independent, S=Supervision, SBA=Standby Assistance, CGA=Contact Guard Assistance,   Min=Minimal Assistance, Mod=Moderate Assistance, Max=Maximal Assistance, Total=Total Assistance, NT=Not Tested    [address] [facility] [address] [id number] AM-PAC 6 Clicks   Daily Activity Inpatient Short Form        How much help from another person does the patient currently need. .. Total A Lot A Little None   1. Putting on and taking off regular lower body clothing?    [x] 1   [] 2 [] 3   [] 4   2. Bathing (including washing, rinsing, drying)? [] 1   [x] 2   [] 3   [] 4   3. Toileting, which includes using toilet, bedpan or urinal?   [x] 1   [] 2   [] 3   [] 4   4. Putting on and taking off regular upper body clothing? [] 1   [x] 2   [] 3   [] 4   5. Taking care of personal grooming such as brushing teeth? [] 1   [] 2   [x] 3   [] 4   6. Eating meals? [] 1   [] 2   [x] 3   [] 4   © 2007, Trustees of [address] [place] [address] [address] [id number], under license to [employer]. All rights reserved     Score:  Initial: 12, completed, 7/7/2021 Most Recent: X (Date: -- )   Interpretation of Tool:  Represents activities that are increasingly more difficult (i.e. Bed mobility, Transfers, Gait). PLAN:   FREQUENCY/DURATION: OT Plan of Care: 3 times/week for duration of hospital stay or until stated goals are met, whichever comes first.    PROBLEM LIST:   (Skilled intervention is medically necessary to address:)  1. Decreased ADL/Functional Activities  2. Decreased Activity Tolerance  3. Decreased AROM/PROM  4. Decreased Balance  5. Decreased Coordination  6. Decreased Gait Ability  7. Decreased Strength  8. Decreased Transfer Abilities  9. Increased Pain with functional mobility. .   INTERVENTIONS PLANNED:   (Benefits and precautions of occupational therapy have been discussed with the patient.)  1. Self Care Training  2. Therapeutic Activity  3. Therapeutic Exercise/HEP  4. Neuromuscular Re-education  5.  Education     TREATMENT:     EVALUATION: Moderate Complexity : (Untimed Charge)    TREATMENT:   Co-Treatment PT/OT necessary due to patient's decreased overall endurance/tolerance levels, as well as need for high level skilled assistance to complete functional transfers/mobility and functional tasks  Neuromuscular Re-education (15 Minutes): Neuromuscular Re-education included Balance Training, Coordination training, Postural training, Sitting balance training and Standing balance training to improve Balance, Coordination and Postural Control. TREATMENT GRID:  N/A    AFTER TREATMENT POSITION/PRECAUTIONS:  Chair, Needs within reach, RN notified and pt wife seated adjacent.     INTERDISCIPLINARY COLLABORATION:  RN/PCT, PT/PTA, OT/AGUIRRE and RN Case Manager/     TOTAL TREATMENT DURATION:  OT Patient Time In/Time Out  Time In: 0918  Time Out: [address] [place], OTR/L

## 2021-07-07 NOTE — PROGRESS NOTES
CM received a phone call from CHATO Tejada with [facility]. She spoke with pt and pt's spouse regarding hospice services. Pt and spouse remain on the same page to d/c home with hospice. Angeline's initial concern was that pt's spouse was planning to return to work on 7-12-21 and there would be no one home with pt. Pt's spouse called CM with an update that she has worked out a plan with her supervisor to work from home and she will have some friends to sit with pt when she needs to leave the house. Spouse does want pt transported home. CM had DNR singed before spouse returned home.

## 2021-07-07 NOTE — PROGRESS NOTES
Reid Stone  Admission Date: 7/4/2021             Daily Progress Note: 7/7/2021    The patient's chart is reviewed and the patient is discussed with the staff. 70 y.o.  male seen and evaluated at the request of Dr. Pauline Schaffer.     From   Mr. Bonnie Ayala is a 69 y/o WM with a h/o severe AS, chronic sCHF (LVEF 15-20% May 2021), EtOH cirrhosis with portal HTN, hypothyroidism who was admitted to our service on 7/4 with acute hypoxemic respiratory failure 2/2 acute on chronic sCHF exacerbation. Started on IV Lasix, Bipap and empiric antibiotics and admitted to ICU. Cardiology consulted. Initially started on low dose Levophed for hypotension, though was weaned off rather quickly. He decompensated after admission and equid NIPPV and this consultation. He received 8-0 mg of lasix IV with virtually no response.     He is much more comfortable but has a large ascites that is impinging on respiratory mechanics    Subjective:     Comfortable, not interested in plurex catheter wants to go home with hospice.       Current Facility-Administered Medications   Medication Dose Route Frequency    midodrine (PROAMATINE) tablet 10 mg  10 mg Oral TID WITH MEALS    acetaminophen (TYLENOL) tablet 650 mg  650 mg Oral Q6H PRN    cefTRIAXone (ROCEPHIN) 2 g in 0.9% sodium chloride (MBP/ADV) 50 mL MBP  2 g IntraVENous Q24H    oxyCODONE IR (ROXICODONE) tablet 5 mg  5 mg Oral Q4H PRN    bumetanide (BUMEX) 0.25 mg/mL infusion  1 mg/hr IntraVENous CONTINUOUS    ondansetron (ZOFRAN) injection 4 mg  4 mg IntraVENous Q4H PRN    azithromycin (ZITHROMAX) 500 mg in 0.9% sodium chloride 250 mL (VIAL-MATE)  500 mg IntraVENous Q24H    thiamine HCL (B-1) tablet 100 mg  100 mg Oral DAILY    FLUoxetine (PROzac) capsule 20 mg  20 mg Oral DAILY    levothyroxine (SYNTHROID) tablet 75 mcg  75 mcg Oral ACB    pantoprazole (PROTONIX) tablet 40 mg  40 mg Oral ACB    tamsulosin (FLOMAX) capsule 0.4 mg  0.4 mg Oral DAILY    sodium chloride (NS) flush 5-40 mL  5-40 mL IntraVENous Q8H    sodium chloride (NS) flush 5-40 mL  5-40 mL IntraVENous PRN    senna (SENOKOT) tablet 8.6 mg  1 Tablet Oral DAILY PRN    [Held by provider] heparin (porcine) injection 5,000 Units  5,000 Units SubCUTAneous Q8H    hydrALAZINE (APRESOLINE) 20 mg/mL injection 10 mg  10 mg IntraVENous Q6H PRN    NOREPINephrine (LEVOPHED) 4 mg in 5% dextrose 250 mL infusion  0.5-16 mcg/min IntraVENous TITRATE       Review of Systems    Constitutional: negative for fever, chills, sweats  Cardiovascular: negative for chest pain, palpitations, syncope, edema  Gastrointestinal:  negative for dysphagia, reflux, vomiting, diarrhea, abdominal pain, or melena  Neurologic:  negative for focal weakness, numbness, headache    Objective:     Vitals:    07/06/21 2138 07/06/21 2305 07/07/21 0222 07/07/21 0730   BP:  (!) 88/67 92/69 95/71   Pulse:  92 91 96   Resp:  20 20 20   Temp:  97.7 °F (36.5 °C) 97.4 °F (36.3 °C) 97.8 °F (36.6 °C)   SpO2: 96% 97% 96% 99%   Weight:  210 lb 12.8 oz (95.6 kg)     Height:             Intake/Output Summary (Last 24 hours) at 7/7/2021 1111  Last data filed at 7/7/2021 0908  Gross per 24 hour   Intake 824 ml   Output 2850 ml   Net -2026 ml       Physical Exam:   Constitution:  the patient is well developed and in no acute distress  EENMT:  Sclera clear, pupils equal, oral mucosa moist  Respiratory: On 3L O2 NC, no wheezing  Cardiovascular:  RRR without M,G,R  Gastrointestinal: soft and non-tender; with positive bowel sounds. Musculoskeletal: warm without cyanosis. There is trace lower extremity edema. Skin:  no jaundice or rashes, no wounds   Neurologic: no gross neuro deficits     Psychiatric:  alert and oriented, lethargic    CXR:  7/4/21    IMPRESSION:  Bibasilar pleural effusions and airspace disease reflecting atelectasis or  Infiltrates. LAB  No results for input(s): GLUCPOC in the last 72 hours.     No lab exists for component: [place]   Recent Labs     07/07/21  0552 07/06/21  0619 07/05/21  0516 07/04/21  1850 07/04/21  1849   WBC 7.4 6.6 10.9 9.5  --    HGB 7.5* 7.3* 8.0* 8.9*  --    HCT 25.8* 24.3* 28.3* 30.0*  --    * 149* 149* 193  --    INR  --   --   --   --  1.2     Recent Labs     07/07/21  0552 07/06/21  0619 07/05/21  0516 07/04/21  1850 07/04/21  1850    142 142   < > 141   K 3.4* 3.6 3.9   < > 3.9    104 106   < > 105   CO2 32 32 28   < > 29   GLU 88 96 85   < > 97   BUN 49* 48* 44*   < > 44*   CREA 1.92* 1.84* 1.62*   < > 1.68*   MG  --  1.9 2.1  --  1.7*   CA 8.5 8.8 8.7   < > 8.9   ALB  --   --  2.2*  --  2.5*   TBILI  --   --  0.5  --  0.6   ALT  --   --  12  --  14    < > = values in this interval not displayed.      Recent Labs     07/04/21  2300   PHI 7.32*   PCO2I 57.5*   PO2I 127*   HCO3I 29.9*     Recent Labs     07/05/21 0516 07/04/21  2321   LAC 1.8 2.0         Assessment:  (Medical Decision Making)     Hospital Problems  Date Reviewed: 7/6/2021        Codes Class Noted POA    Pleural effusion on right ICD-10-CM: J90  ICD-9-CM: 511.9  7/6/2021 Unknown        SBP (spontaneous bacterial peritonitis) ([facility] ICD-10-CM: K65.2  ICD-9-CM: 567.23  7/6/2021 Unknown        Acute on chronic systolic heart failure ([facility] ICD-10-CM: I50.23  ICD-9-CM: 428.23  7/4/2021 Yes        Stage 3 chronic kidney disease ([facility] ICD-10-CM: N18.30  ICD-9-CM: 585.3  7/4/2021 Yes        Portal hypertension ([facility] ICD-10-CM: K76.6  ICD-9-CM: 572.3  7/4/2021 Yes        * (Principal) Acute respiratory failure with hypoxia and hypercapnia (HCC) ICD-10-CM: J96.01, J96.02  ICD-9-CM: 518.81  7/4/2021 Unknown        Anasarca ICD-10-CM: R60.1  ICD-9-CM: 782.3  11/19/2019 Yes        Ascites due to alcoholic cirrhosis ([facility] [id number]: K70.31  ICD-9-CM: 571.2  10/8/2019 Unknown        Aortic valve stenosis ICD-10-CM: I35.0  ICD-9-CM: 424.1  10/8/2019 Yes              Plan:  (Medical Decision Making)     --Wants to go to home hospice and does not want plurex catheter. --Patient is a DNR    Nothing to add, discussed with patient and his wife, patient is fully appropriate and oriented. Will sign off and see if needed in the future    More than 50% of the time documented was spent in face-to-face contact with the patient and in the care of the patient on the floor/unit where the patient is located.     Ramses Santo MD

## 2021-07-08 NOTE — CASE COMMUNICATION
services declined.  made initial contact and spoke with Wang Fernandes, pt's wife, about  services and coming out for the initial visit/spiritual assessment. Sylvia put  on hold and spoke with Jd Wooten, her , about the  coming out. Wang Fernandes stated that [employer]  affirmed their decision and asked them to call if and when they wanted/needed a  to visit. Wang Fernandes stated they would and thanked the  for calling. 's initial visit/spiritual assessment/services are declined at this time.

## 2021-07-08 NOTE — CASE COMMUNICATION
services declined.  made initial contact and spoke with Paola Said, pt's wife, about  services and coming out for the initial visit/spiritual assessment. Sylvia put  on hold and spoke with Marcio Gonzales, her , about the  coming out. Paola Said stated that [employer].  affirmed their decision and asked them to call if and when they wanted/needed a  to visit. Paola Said stated they would and thanked the  for calling.

## 2021-07-14 LAB
CHYLOMICRON SCREEN, CHYLMT: NORMAL
SPECIMEN SOURCE: NORMAL

## 2021-07-20 LAB
FLOW CYTOMETRY, FBTC1: NORMAL
SPECIMEN SOURCE: NORMAL
TEST ORDERED:: NORMAL

## 2021-08-19 LAB
ACID FAST STN SPEC: NEGATIVE
MYCOBACTERIUM SPEC QL CULT: NEGATIVE
SPECIMEN PREPARATION: NORMAL
SPECIMEN SOURCE: NORMAL

## 2021-08-25 LAB
ACID FAST STN SPEC: NEGATIVE
MYCOBACTERIUM SPEC QL CULT: NEGATIVE
SPECIMEN PREPARATION: NORMAL
SPECIMEN SOURCE: NORMAL

## (undated) DEVICE — NEEDLE HYPO 21GA L1.5IN INTRAMUSCULAR S STL LATCH BVL UP

## (undated) DEVICE — SURGICAL PROCEDURE PACK BASIC ST FRANCIS

## (undated) DEVICE — 3M™ TEGADERM™ TRANSPARENT FILM DRESSING FRAME STYLE, 1626W, 4 IN X 4-3/4 IN (10 CM X 12 CM), 50/CT 4CT/CASE: Brand: 3M™ TEGADERM™

## (undated) DEVICE — BLOCK BITE AD 60FR W/ VELC STRP ADDRESSES MOST PT AND

## (undated) DEVICE — SUTURE VCRL SZ 4-0 L27IN ABSRB UD L19MM PS-2 3/8 CIR PRIM J426H

## (undated) DEVICE — SOLUTION IV 1000ML 0.9% SOD CHL

## (undated) DEVICE — SUTURE VCRL SZ 3-0 L27IN ABSRB UD L26MM CT-2 1/2 CIR J232H

## (undated) DEVICE — REM POLYHESIVE ADULT PATIENT RETURN ELECTRODE: Brand: VALLEYLAB

## (undated) DEVICE — AMD ANTIMICROBIAL NON-ADHERENT PAD,0.2% POLYHEXAMETHYLENE BIGUANIDE HCI (PHMB): Brand: TELFA

## (undated) DEVICE — KENDALL RADIOLUCENT FOAM MONITORING ELECTRODE RECTANGULAR SHAPE: Brand: KENDALL

## (undated) DEVICE — 2000CC GUARDIAN II: Brand: GUARDIAN

## (undated) DEVICE — SUT ETHLN 3-0 18IN FS1 BLK --

## (undated) DEVICE — MEDI-VAC YANKAUER SUCTION HANDLE W/BULBOUS TIP: Brand: CARDINAL HEALTH

## (undated) DEVICE — CANNULA NSL ORAL AD FOR CAPNOFLEX CO2 O2 AIRLFE

## (undated) DEVICE — MASTISOL ADHESIVE LIQ 2/3ML

## (undated) DEVICE — BUTTON SWITCH PENCIL BLADE ELECTRODE, HOLSTER: Brand: EDGE

## (undated) DEVICE — CONTAINER SPEC FRMLN 120ML --

## (undated) DEVICE — SHEET, T, LAPAROTOMY, STERILE: Brand: MEDLINE

## (undated) DEVICE — (D)PREP SKN CHLRAPRP APPL 26ML -- CONVERT TO ITEM 371833

## (undated) DEVICE — CONNECTOR TBNG OD5-7MM O2 END DISP